# Patient Record
Sex: FEMALE | Race: OTHER | HISPANIC OR LATINO
[De-identification: names, ages, dates, MRNs, and addresses within clinical notes are randomized per-mention and may not be internally consistent; named-entity substitution may affect disease eponyms.]

---

## 2023-06-16 PROBLEM — Z00.00 ENCOUNTER FOR PREVENTIVE HEALTH EXAMINATION: Status: ACTIVE | Noted: 2023-06-16

## 2023-06-19 ENCOUNTER — LABORATORY RESULT (OUTPATIENT)
Age: 23
End: 2023-06-19

## 2023-06-19 ENCOUNTER — ASOB RESULT (OUTPATIENT)
Age: 23
End: 2023-06-19

## 2023-06-19 ENCOUNTER — APPOINTMENT (OUTPATIENT)
Dept: OBGYN | Facility: CLINIC | Age: 23
End: 2023-06-19
Payer: MEDICAID

## 2023-06-19 VITALS — WEIGHT: 173 LBS | SYSTOLIC BLOOD PRESSURE: 111 MMHG | DIASTOLIC BLOOD PRESSURE: 69 MMHG

## 2023-06-19 DIAGNOSIS — Z34.90 ENCOUNTER FOR SUPERVISION OF NORMAL PREGNANCY, UNSPECIFIED, UNSPECIFIED TRIMESTER: ICD-10-CM

## 2023-06-19 DIAGNOSIS — B36.9 SUPERFICIAL MYCOSIS, UNSPECIFIED: ICD-10-CM

## 2023-06-19 PROCEDURE — 76815 OB US LIMITED FETUS(S): CPT

## 2023-06-19 PROCEDURE — 76801 OB US < 14 WKS SINGLE FETUS: CPT

## 2023-06-19 PROCEDURE — XXXXX: CPT | Mod: 1L

## 2023-06-19 RX ORDER — PRENATAL VIT NO.130/IRON/FOLIC 27MG-0.8MG
28-0.8 TABLET ORAL
Qty: 30 | Refills: 7 | Status: ACTIVE | COMMUNITY
Start: 2023-06-19 | End: 1900-01-01

## 2023-06-19 RX ORDER — NYSTATIN 100MM UNIT
POWDER (EA) MISCELLANEOUS
Qty: 1 | Refills: 6 | Status: ACTIVE | COMMUNITY
Start: 2023-06-19 | End: 1900-01-01

## 2023-06-29 ENCOUNTER — APPOINTMENT (OUTPATIENT)
Dept: ANTEPARTUM | Facility: CLINIC | Age: 23
End: 2023-06-29
Payer: MEDICAID

## 2023-06-29 ENCOUNTER — ASOB RESULT (OUTPATIENT)
Age: 23
End: 2023-06-29

## 2023-06-29 PROCEDURE — 76801 OB US < 14 WKS SINGLE FETUS: CPT

## 2023-06-29 PROCEDURE — 76813 OB US NUCHAL MEAS 1 GEST: CPT | Mod: 59

## 2023-07-11 ENCOUNTER — NON-APPOINTMENT (OUTPATIENT)
Age: 23
End: 2023-07-11

## 2023-07-13 ENCOUNTER — APPOINTMENT (OUTPATIENT)
Dept: OBGYN | Facility: CLINIC | Age: 23
End: 2023-07-13
Payer: MEDICAID

## 2023-07-13 VITALS — WEIGHT: 166 LBS | DIASTOLIC BLOOD PRESSURE: 65 MMHG | SYSTOLIC BLOOD PRESSURE: 92 MMHG

## 2023-07-13 DIAGNOSIS — B37.31 ACUTE CANDIDIASIS OF VULVA AND VAGINA: ICD-10-CM

## 2023-07-13 PROCEDURE — 99212 OFFICE O/P EST SF 10 MIN: CPT | Mod: 25

## 2023-07-13 PROCEDURE — 0502F SUBSEQUENT PRENATAL CARE: CPT

## 2023-07-13 RX ORDER — TERCONAZOLE 8 MG/G
0.8 CREAM VAGINAL
Qty: 1 | Refills: 0 | Status: ACTIVE | COMMUNITY
Start: 2023-07-13 | End: 1900-01-01

## 2023-07-13 RX ORDER — FLUCONAZOLE 150 MG/1
150 TABLET ORAL
Qty: 1 | Refills: 0 | Status: ACTIVE | COMMUNITY
Start: 2023-07-13 | End: 1900-01-01

## 2023-07-15 ENCOUNTER — LABORATORY RESULT (OUTPATIENT)
Age: 23
End: 2023-07-15

## 2023-07-18 RX ORDER — PYRIDOXINE HCL (VITAMIN B6) 25 MG
25 TABLET ORAL EVERY 8 HOURS
Qty: 45 | Refills: 0 | Status: ACTIVE | COMMUNITY
Start: 2023-06-30 | End: 1900-01-01

## 2023-08-03 ENCOUNTER — INPATIENT (INPATIENT)
Facility: HOSPITAL | Age: 23
LOS: 3 days | Discharge: HOME | DRG: 832 | End: 2023-08-07
Attending: OBSTETRICS & GYNECOLOGY | Admitting: OBSTETRICS & GYNECOLOGY
Payer: MEDICAID

## 2023-08-03 ENCOUNTER — NON-APPOINTMENT (OUTPATIENT)
Age: 23
End: 2023-08-03

## 2023-08-03 VITALS
DIASTOLIC BLOOD PRESSURE: 56 MMHG | HEIGHT: 60.63 IN | SYSTOLIC BLOOD PRESSURE: 94 MMHG | TEMPERATURE: 99 F | RESPIRATION RATE: 17 BRPM | OXYGEN SATURATION: 98 % | WEIGHT: 165.35 LBS | HEART RATE: 88 BPM

## 2023-08-03 DIAGNOSIS — N12 TUBULO-INTERSTITIAL NEPHRITIS, NOT SPECIFIED AS ACUTE OR CHRONIC: ICD-10-CM

## 2023-08-03 LAB
ALBUMIN SERPL ELPH-MCNC: 3.1 G/DL — LOW (ref 3.5–5)
ALP SERPL-CCNC: 63 U/L — SIGNIFICANT CHANGE UP (ref 40–120)
ALT FLD-CCNC: 39 U/L DA — SIGNIFICANT CHANGE UP (ref 10–60)
ANION GAP SERPL CALC-SCNC: 11 MMOL/L — SIGNIFICANT CHANGE UP (ref 5–17)
APPEARANCE UR: CLEAR — SIGNIFICANT CHANGE UP
APTT BLD: 27.2 SEC — SIGNIFICANT CHANGE UP (ref 24.5–35.6)
AST SERPL-CCNC: 15 U/L — SIGNIFICANT CHANGE UP (ref 10–40)
BASOPHILS # BLD AUTO: 0.02 K/UL — SIGNIFICANT CHANGE UP (ref 0–0.2)
BASOPHILS NFR BLD AUTO: 0.2 % — SIGNIFICANT CHANGE UP (ref 0–2)
BILIRUB SERPL-MCNC: 0.4 MG/DL — SIGNIFICANT CHANGE UP (ref 0.2–1.2)
BILIRUB UR-MCNC: NEGATIVE — SIGNIFICANT CHANGE UP
BUN SERPL-MCNC: 5 MG/DL — LOW (ref 7–18)
CALCIUM SERPL-MCNC: 8.9 MG/DL — SIGNIFICANT CHANGE UP (ref 8.4–10.5)
CHLORIDE SERPL-SCNC: 106 MMOL/L — SIGNIFICANT CHANGE UP (ref 96–108)
CO2 SERPL-SCNC: 21 MMOL/L — LOW (ref 22–31)
COLOR SPEC: YELLOW — SIGNIFICANT CHANGE UP
CREAT SERPL-MCNC: 0.42 MG/DL — LOW (ref 0.5–1.3)
DIFF PNL FLD: ABNORMAL
EGFR: 143 ML/MIN/1.73M2 — SIGNIFICANT CHANGE UP
EOSINOPHIL # BLD AUTO: 0.04 K/UL — SIGNIFICANT CHANGE UP (ref 0–0.5)
EOSINOPHIL NFR BLD AUTO: 0.4 % — SIGNIFICANT CHANGE UP (ref 0–6)
GLUCOSE SERPL-MCNC: 84 MG/DL — SIGNIFICANT CHANGE UP (ref 70–99)
GLUCOSE UR QL: NEGATIVE — SIGNIFICANT CHANGE UP
HCG SERPL-ACNC: HIGH MIU/ML
HCT VFR BLD CALC: 31.9 % — LOW (ref 34.5–45)
HGB BLD-MCNC: 10.5 G/DL — LOW (ref 11.5–15.5)
IMM GRANULOCYTES NFR BLD AUTO: 0.4 % — SIGNIFICANT CHANGE UP (ref 0–0.9)
INR BLD: 1.04 RATIO — SIGNIFICANT CHANGE UP (ref 0.85–1.18)
KETONES UR-MCNC: ABNORMAL
LACTATE SERPL-SCNC: 0.6 MMOL/L — LOW (ref 0.7–2)
LEUKOCYTE ESTERASE UR-ACNC: ABNORMAL
LIDOCAIN IGE QN: 66 U/L — LOW (ref 73–393)
LYMPHOCYTES # BLD AUTO: 1.42 K/UL — SIGNIFICANT CHANGE UP (ref 1–3.3)
LYMPHOCYTES # BLD AUTO: 13.6 % — SIGNIFICANT CHANGE UP (ref 13–44)
MCHC RBC-ENTMCNC: 28.4 PG — SIGNIFICANT CHANGE UP (ref 27–34)
MCHC RBC-ENTMCNC: 32.9 GM/DL — SIGNIFICANT CHANGE UP (ref 32–36)
MCV RBC AUTO: 86.2 FL — SIGNIFICANT CHANGE UP (ref 80–100)
MONOCYTES # BLD AUTO: 0.87 K/UL — SIGNIFICANT CHANGE UP (ref 0–0.9)
MONOCYTES NFR BLD AUTO: 8.3 % — SIGNIFICANT CHANGE UP (ref 2–14)
NEUTROPHILS # BLD AUTO: 8.07 K/UL — HIGH (ref 1.8–7.4)
NEUTROPHILS NFR BLD AUTO: 77.1 % — HIGH (ref 43–77)
NITRITE UR-MCNC: POSITIVE
NRBC # BLD: 0 /100 WBCS — SIGNIFICANT CHANGE UP (ref 0–0)
NT-PROBNP SERPL-SCNC: 61 PG/ML — SIGNIFICANT CHANGE UP (ref 0–125)
PH UR: 7 — SIGNIFICANT CHANGE UP (ref 5–8)
PLATELET # BLD AUTO: 204 K/UL — SIGNIFICANT CHANGE UP (ref 150–400)
POTASSIUM SERPL-MCNC: 3.5 MMOL/L — SIGNIFICANT CHANGE UP (ref 3.5–5.3)
POTASSIUM SERPL-SCNC: 3.5 MMOL/L — SIGNIFICANT CHANGE UP (ref 3.5–5.3)
PROT SERPL-MCNC: 6.7 G/DL — SIGNIFICANT CHANGE UP (ref 6–8.3)
PROT UR-MCNC: 30 MG/DL
PROTHROM AB SERPL-ACNC: 11.8 SEC — SIGNIFICANT CHANGE UP (ref 9.5–13)
RAPID RVP RESULT: SIGNIFICANT CHANGE UP
RBC # BLD: 3.7 M/UL — LOW (ref 3.8–5.2)
RBC # FLD: 13.9 % — SIGNIFICANT CHANGE UP (ref 10.3–14.5)
SARS-COV-2 RNA SPEC QL NAA+PROBE: SIGNIFICANT CHANGE UP
SODIUM SERPL-SCNC: 138 MMOL/L — SIGNIFICANT CHANGE UP (ref 135–145)
SP GR SPEC: 1.01 — SIGNIFICANT CHANGE UP (ref 1.01–1.02)
TROPONIN I, HIGH SENSITIVITY RESULT: <3 NG/L — SIGNIFICANT CHANGE UP
UROBILINOGEN FLD QL: 1 MG/DL
WBC # BLD: 10.46 K/UL — SIGNIFICANT CHANGE UP (ref 3.8–10.5)
WBC # FLD AUTO: 10.46 K/UL — SIGNIFICANT CHANGE UP (ref 3.8–10.5)

## 2023-08-03 PROCEDURE — 76700 US EXAM ABDOM COMPLETE: CPT | Mod: 26

## 2023-08-03 PROCEDURE — 76805 OB US >/= 14 WKS SNGL FETUS: CPT | Mod: 26

## 2023-08-03 PROCEDURE — 99285 EMERGENCY DEPT VISIT HI MDM: CPT

## 2023-08-03 PROCEDURE — 71046 X-RAY EXAM CHEST 2 VIEWS: CPT | Mod: 26

## 2023-08-03 PROCEDURE — 93975 VASCULAR STUDY: CPT | Mod: 26

## 2023-08-03 PROCEDURE — 76817 TRANSVAGINAL US OBSTETRIC: CPT | Mod: 26

## 2023-08-03 PROCEDURE — 93010 ELECTROCARDIOGRAM REPORT: CPT

## 2023-08-03 RX ORDER — CEFAZOLIN SODIUM 1 G
1000 VIAL (EA) INJECTION ONCE
Refills: 0 | Status: COMPLETED | OUTPATIENT
Start: 2023-08-03 | End: 2023-08-03

## 2023-08-03 RX ORDER — CEFAZOLIN SODIUM 1 G
VIAL (EA) INJECTION
Refills: 0 | Status: DISCONTINUED | OUTPATIENT
Start: 2023-08-03 | End: 2023-08-07

## 2023-08-03 RX ORDER — METOCLOPRAMIDE HCL 10 MG
10 TABLET ORAL ONCE
Refills: 0 | Status: COMPLETED | OUTPATIENT
Start: 2023-08-03 | End: 2023-08-03

## 2023-08-03 RX ORDER — ACETAMINOPHEN 500 MG
650 TABLET ORAL EVERY 6 HOURS
Refills: 0 | Status: DISCONTINUED | OUTPATIENT
Start: 2023-08-03 | End: 2023-08-04

## 2023-08-03 RX ORDER — ONDANSETRON 8 MG/1
4 TABLET, FILM COATED ORAL EVERY 6 HOURS
Refills: 0 | Status: DISCONTINUED | OUTPATIENT
Start: 2023-08-03 | End: 2023-08-07

## 2023-08-03 RX ORDER — CEFAZOLIN SODIUM 1 G
VIAL (EA) INJECTION
Refills: 0 | Status: DISCONTINUED | OUTPATIENT
Start: 2023-08-03 | End: 2023-08-03

## 2023-08-03 RX ORDER — SODIUM CHLORIDE 9 MG/ML
1000 INJECTION, SOLUTION INTRAVENOUS
Refills: 0 | Status: DISCONTINUED | OUTPATIENT
Start: 2023-08-03 | End: 2023-08-04

## 2023-08-03 RX ORDER — CEFAZOLIN SODIUM 1 G
1000 VIAL (EA) INJECTION EVERY 8 HOURS
Refills: 0 | Status: DISCONTINUED | OUTPATIENT
Start: 2023-08-03 | End: 2023-08-07

## 2023-08-03 RX ORDER — SODIUM CHLORIDE 9 MG/ML
1000 INJECTION INTRAMUSCULAR; INTRAVENOUS; SUBCUTANEOUS ONCE
Refills: 0 | Status: COMPLETED | OUTPATIENT
Start: 2023-08-03 | End: 2023-08-03

## 2023-08-03 RX ORDER — CEFAZOLIN SODIUM 1 G
2000 VIAL (EA) INJECTION EVERY 8 HOURS
Refills: 0 | Status: DISCONTINUED | OUTPATIENT
Start: 2023-08-03 | End: 2023-08-03

## 2023-08-03 RX ORDER — CEFAZOLIN SODIUM 1 G
2000 VIAL (EA) INJECTION ONCE
Refills: 0 | Status: DISCONTINUED | OUTPATIENT
Start: 2023-08-03 | End: 2023-08-03

## 2023-08-03 RX ORDER — ACETAMINOPHEN 500 MG
650 TABLET ORAL ONCE
Refills: 0 | Status: COMPLETED | OUTPATIENT
Start: 2023-08-03 | End: 2023-08-03

## 2023-08-03 RX ADMIN — Medication 100 MILLIGRAM(S): at 21:57

## 2023-08-03 RX ADMIN — Medication 100 MILLIGRAM(S): at 14:05

## 2023-08-03 RX ADMIN — SODIUM CHLORIDE 75 MILLILITER(S): 9 INJECTION, SOLUTION INTRAVENOUS at 14:06

## 2023-08-03 RX ADMIN — Medication 650 MILLIGRAM(S): at 11:53

## 2023-08-03 RX ADMIN — Medication 650 MILLIGRAM(S): at 22:30

## 2023-08-03 RX ADMIN — Medication 104 MILLIGRAM(S): at 11:23

## 2023-08-03 RX ADMIN — Medication 650 MILLIGRAM(S): at 21:58

## 2023-08-03 RX ADMIN — SODIUM CHLORIDE 1000 MILLILITER(S): 9 INJECTION INTRAMUSCULAR; INTRAVENOUS; SUBCUTANEOUS at 11:22

## 2023-08-03 RX ADMIN — Medication 650 MILLIGRAM(S): at 11:23

## 2023-08-03 RX ADMIN — Medication 1 TABLET(S): at 14:05

## 2023-08-03 RX ADMIN — SODIUM CHLORIDE 75 MILLILITER(S): 9 INJECTION, SOLUTION INTRAVENOUS at 21:57

## 2023-08-03 NOTE — H&P ADULT - PROBLEM SELECTOR PLAN 1
Ancef 1g q 8 hours  Fetal heart checks daily  Increase oral hydration  Tylenol PRN    Dr Guerra aware

## 2023-08-03 NOTE — ED PROVIDER NOTE - PHYSICAL EXAMINATION
Afebrile, hemodynamically stable, saturating well on room air  NAD, well appearing, sitting comfortably in bed, no WOB/tachypnea, speaking full sentences  Head NCAT  EOMI grossly, anicteric  MMM  No JVD  RRR, nml S1/S2, no m/r/g  Lungs CTAB, no w/r/r  Abd soft, reports mild right lower quadrant TTP, ND, nml BS, no rebound or guarding, +CVAT  AAO, CN's 3-12 grossly intact, motor 5/5 in all extremities, full bilateral straight leg raise, normal independent gait  BERMUDEZ spontaneously, no leg cyanosis or edema  Skin warm, well perfused, no rashes or hives

## 2023-08-03 NOTE — H&P ADULT - HISTORY OF PRESENT ILLNESS
Patient is a 23 year old  at 17w4d who presents to ER with complaint of subjective fever, and right sided pain since yesterday.  States that the pain radiates from her right lower ribs to her right lower quadrant.  States that she didnt take any tylenol for fever or pain.  Denies vaginal bleeding, leakage of fluid, decreased fetal movement, abdominal pain, or any other concerns.    PNC with MYRIAM Guerra  PMHx: Denies  Sxhx: Denies  Meds: PNV  Allergies: NKDA   OBHx: Primgravida  GynHx: Normal menses, one partner, no stds, no cysts, no fibroids  SocialHx: Neg x3    Physical Exam:  Gen: A&O x3, NAD  Chest: CTABL  Cardiac: S1+S2+  RRR  Abdomen: Soft, nontender, gravid uterus, no suprapubic tenderness  + right CVAT

## 2023-08-03 NOTE — ED PROVIDER NOTE - CLINICAL SUMMARY MEDICAL DECISION MAKING FREE TEXT BOX
Abdomen nonperitoneal and multiple other symptoms and character low suspicion for appendicitis to warrant MRI imaging. Character and appearance low suspicion for ectopic pregnancy and ovarian torsion, and ultrasound __________. Character low suspicion for PID. Abdomen nonperitoneal and multiple other symptoms and character low suspicion for appendicitis to warrant MRI imaging. Character and appearance low suspicion for ectopic pregnancy and ovarian torsion, and ultrasound negative. Character low suspicion for PID. Character and CVAT c/w acute pyelonephritis, and pt with significantly positive UA. No significant hematuria and character lower suspicion and appearance very low suspicion for kidney stone, and OB/GYN assuming care at this time and do not want MRI to r/o definitively. Patient very well appearing, hemodynamically stable. Given IV abx. Admitted to OB/GYN for further monitoring, w/u, and care. Abdomen nonperitoneal and multiple other symptoms and character low suspicion for appendicitis to warrant MRI imaging. Character and appearance low suspicion for ectopic pregnancy and ovarian torsion, and ultrasound negative. Character low suspicion for PID. Character and CVAT c/w acute pyelonephritis, and pt with significantly positive UA. No significant hematuria and character lower suspicion and appearance very low suspicion for kidney stone, and OB/GYN assuming care at this time and do not want MRI to r/o definitively. Patient very well appearing, hemodynamically stable. Given IV abx. Admitted to OB/GYN for further monitoring, w/u, and care.  Regarding lower rib pain, had extensive shared decision making with pt and mother via  as well as via tech at Xray regarding CXR, and consented to CXR. Character low suspicion for PE and no tachycardia, hypoxia, or e/o DVT, and constellation of symptoms and another very apparent etiology make PE low suspicion at this time to warrant further w/u for this. No e/o PNA, PTX, or fluid overload on exam or CXR.

## 2023-08-03 NOTE — ED ADULT NURSE NOTE - ED STAT RN HANDOFF DETAILS 2
Patient A&Ox4 Pal[place] speaking admitted to OBGYN Vital signs stable as documented, IV intact, no redness or swelling noted. Endorsed to Naina VILLAREAL. Pending transfer to .

## 2023-08-03 NOTE — ED ADULT NURSE NOTE - OBJECTIVE STATEMENT
Pt c/o subjective fevers, right sided abdominal pain x yesterday. Pt denies diarrhea. Pt is 18 weeks pregnant. No acute distress noted, denies chest pain, no SOB noted.

## 2023-08-03 NOTE — ED PROVIDER NOTE - OBJECTIVE STATEMENT
[id number].  23-year-old female G1, P0 at 18 weeks and 5 days, followed by OB/GN Dr. Guerra, no other past medical history, on no medications, presents with mother with multiple complaints. She reports subjective fever since yesterday, has not attempted antipyresis. Also reporting right-sided lower rib pain, constant since yesterday, worsened with movement, cough, and deep breath. Associated right lower quadrant abdominal pain since yesterday, intermittent, worse with cough or deep breath or movement. On review of systems reports mild cough, dysuria, decreased urine output, and mild shortness of breath. Has had vomiting throughout pregnancy with last emesis this morning, though unchanged from her usual emesis. Denies diarrhea, constipation, abnormal vaginal discharge, and all other symptoms. Denies recent long distance travel.

## 2023-08-03 NOTE — PATIENT PROFILE ADULT - FALL HARM RISK - UNIVERSAL INTERVENTIONS
Bed in lowest position, wheels locked, appropriate side rails in place/Call bell, personal items and telephone in reach/Instruct patient to call for assistance before getting out of bed or chair/Non-slip footwear when patient is out of bed/[place] to call system/Physically safe environment - no spills, clutter or unnecessary equipment/Purposeful Proactive Rounding/Room/bathroom lighting operational, light cord in reach

## 2023-08-03 NOTE — PATIENT PROFILE ADULT - FUNCTIONAL ASSESSMENT - DAILY ACTIVITY 2.
Received request via: Pharmacy    Was the patient seen in the last year in this department? Yes    Does the patient have an active prescription (recently filled or refills available) for medication(s) requested? No     4 = No assist / stand by assistance

## 2023-08-03 NOTE — H&P ADULT - NSHPLABSRESULTS_GEN_ALL_CORE
10.5   10.46 )-----------( 204      ( 03 Aug 2023 10:56 )             31.9       < from: US Transvaginal, OB (08.03.23 @ 10:28) >      INTERPRETATION:  Transabdominal and transvaginal obstetrical ultrasounds    Indication: Pregnant woman with right lower quadrant abdominal pain.    Transabdominal and transvaginal obstetrical ultrasounds are performed.  A   single intrauterine gestation is seen. The fetal presentation is   cephalic. The cervix is closed, measuring 4.9 cm in length which is   within normal limits.. The placenta is anterior in location and it   appears grossly intact. There is positive fetal cardiac activity at 142   BPM. The ALDO measures 12.2 cm which is within normal limits.    Fetal measurements for gestational age:  Biparietal diameter 3.83 cm, 17 weeks and 5days  Head circumference 14.85 cm, 18 weeks and 0 days  Abdominal circumference 12.50 cm, 18 weeks and 1 days  Femoral length 2.81 cm, 18 weeks and 4 days    The average gestational age by ultrasound is 18 weeks and 1 days.   Expected date of delivery by ultrasound is 1/3/2024.    Estimated fetal weight is 233 g    The fetal diaphragm, stomach, kidneys and urinary bladder appear grossly   unremarkable.    The right ovary measures 3.9 x 4.3 x 1.9 cm and appears unremarkable. The   left ovary measures 5.3 x 4.9 x 1.7 cm and contains a 1.7 x 1.6 x 1.7 cm   follicular cyst. Duplex Doppler flow is demonstrated for both ovaries.    Impression: Single live intrauterine gestation.      < from: US Abdomen Complete (US Abdomen Complete .) (08.03.23 @ 10:28) >    IMPRESSION:  Mild right hydronephrosis.    < end of copied text >

## 2023-08-03 NOTE — ED ADULT NURSE NOTE - ED STAT RN HANDOFF DETAILS
Report given to MONO GIRALDO. Pt resting in bed, no acute distress noted, denies chest pain, no SOB noted.

## 2023-08-03 NOTE — ED ADULT NURSE NOTE - NSFALLUNIVINTERV_ED_ALL_ED
Bed/Stretcher in lowest position, wheels locked, appropriate side rails in place/Call bell, personal items and telephone in reach/Instruct patient to call for assistance before getting out of bed/chair/stretcher/Non-slip footwear applied when patient is off stretcher/[place] to call system/Physically safe environment - no spills, clutter or unnecessary equipment/Purposeful proactive rounding/Room/bathroom lighting operational, light cord in reach

## 2023-08-04 LAB
BASOPHILS # BLD AUTO: 0.02 K/UL — SIGNIFICANT CHANGE UP (ref 0–0.2)
BASOPHILS NFR BLD AUTO: 0.2 % — SIGNIFICANT CHANGE UP (ref 0–2)
EOSINOPHIL # BLD AUTO: 0.02 K/UL — SIGNIFICANT CHANGE UP (ref 0–0.5)
EOSINOPHIL NFR BLD AUTO: 0.2 % — SIGNIFICANT CHANGE UP (ref 0–6)
HCT VFR BLD CALC: 31.2 % — LOW (ref 34.5–45)
HGB BLD-MCNC: 10.6 G/DL — LOW (ref 11.5–15.5)
IMM GRANULOCYTES NFR BLD AUTO: 0.4 % — SIGNIFICANT CHANGE UP (ref 0–0.9)
LYMPHOCYTES # BLD AUTO: 1.23 K/UL — SIGNIFICANT CHANGE UP (ref 1–3.3)
LYMPHOCYTES # BLD AUTO: 10.7 % — LOW (ref 13–44)
MCHC RBC-ENTMCNC: 28.7 PG — SIGNIFICANT CHANGE UP (ref 27–34)
MCHC RBC-ENTMCNC: 34 GM/DL — SIGNIFICANT CHANGE UP (ref 32–36)
MCV RBC AUTO: 84.6 FL — SIGNIFICANT CHANGE UP (ref 80–100)
MONOCYTES # BLD AUTO: 0.92 K/UL — HIGH (ref 0–0.9)
MONOCYTES NFR BLD AUTO: 8 % — SIGNIFICANT CHANGE UP (ref 2–14)
NEUTROPHILS # BLD AUTO: 9.21 K/UL — HIGH (ref 1.8–7.4)
NEUTROPHILS NFR BLD AUTO: 80.5 % — HIGH (ref 43–77)
NRBC # BLD: 0 /100 WBCS — SIGNIFICANT CHANGE UP (ref 0–0)
PLATELET # BLD AUTO: 201 K/UL — SIGNIFICANT CHANGE UP (ref 150–400)
RBC # BLD: 3.69 M/UL — LOW (ref 3.8–5.2)
RBC # FLD: 13.9 % — SIGNIFICANT CHANGE UP (ref 10.3–14.5)
WBC # BLD: 11.45 K/UL — HIGH (ref 3.8–10.5)
WBC # FLD AUTO: 11.45 K/UL — HIGH (ref 3.8–10.5)

## 2023-08-04 RX ORDER — MORPHINE SULFATE 50 MG/1
4 CAPSULE, EXTENDED RELEASE ORAL EVERY 4 HOURS
Refills: 0 | Status: DISCONTINUED | OUTPATIENT
Start: 2023-08-04 | End: 2023-08-07

## 2023-08-04 RX ORDER — FAMOTIDINE 10 MG/ML
20 INJECTION INTRAVENOUS DAILY
Refills: 0 | Status: DISCONTINUED | OUTPATIENT
Start: 2023-08-04 | End: 2023-08-07

## 2023-08-04 RX ORDER — FERROUS SULFATE 325(65) MG
325 TABLET ORAL DAILY
Refills: 0 | Status: DISCONTINUED | OUTPATIENT
Start: 2023-08-04 | End: 2023-08-07

## 2023-08-04 RX ORDER — SODIUM CHLORIDE 9 MG/ML
1000 INJECTION, SOLUTION INTRAVENOUS
Refills: 0 | Status: DISCONTINUED | OUTPATIENT
Start: 2023-08-04 | End: 2023-08-05

## 2023-08-04 RX ORDER — ACETAMINOPHEN 500 MG
975 TABLET ORAL EVERY 6 HOURS
Refills: 0 | Status: DISCONTINUED | OUTPATIENT
Start: 2023-08-04 | End: 2023-08-07

## 2023-08-04 RX ADMIN — Medication 100 MILLIGRAM(S): at 13:52

## 2023-08-04 RX ADMIN — SODIUM CHLORIDE 75 MILLILITER(S): 9 INJECTION, SOLUTION INTRAVENOUS at 12:17

## 2023-08-04 RX ADMIN — Medication 100 MILLIGRAM(S): at 22:03

## 2023-08-04 RX ADMIN — Medication 975 MILLIGRAM(S): at 21:00

## 2023-08-04 RX ADMIN — Medication 1 TABLET(S): at 12:05

## 2023-08-04 RX ADMIN — SODIUM CHLORIDE 250 MILLILITER(S): 9 INJECTION, SOLUTION INTRAVENOUS at 20:16

## 2023-08-04 RX ADMIN — Medication 100 MILLIGRAM(S): at 05:16

## 2023-08-04 RX ADMIN — Medication 975 MILLIGRAM(S): at 20:15

## 2023-08-04 NOTE — CHART NOTE - NSCHARTNOTEFT_GEN_A_CORE
Patient seen at bedside  offers no complaints  denies abd pain, vaginal bleeding, leakage of fluids or contraction pain    Vital Signs Last 24 Hrs  T(C): 36.3 (04 Aug 2023 12:41), Max: 38.7 (04 Aug 2023 05:55)  T(F): 97.4 (04 Aug 2023 12:41), Max: 101.6 (04 Aug 2023 05:55)  HR: 84 (04 Aug 2023 12:41) (84 - 115)  BP: 99/62 (04 Aug 2023 12:41) (99/62 - 116/67)  BP(mean): --  RR: 18 (04 Aug 2023 12:41) (18 - 18)  SpO2: 97% (04 Aug 2023 12:41) (96% - 97%)    Parameters below as of 04 Aug 2023 12:41  Patient On (Oxygen Delivery Method): room air    + FH via bedside sono    a/p siup @  17w5d HD#2, susptected pylenephritits, currently stable  - continue ancef q 8hrs  - tylenol prn fever  - regular diet  - oob, ambulation,   - continue current care

## 2023-08-05 LAB
ANION GAP SERPL CALC-SCNC: 8 MMOL/L — SIGNIFICANT CHANGE UP (ref 5–17)
BASOPHILS # BLD AUTO: 0.03 K/UL — SIGNIFICANT CHANGE UP (ref 0–0.2)
BASOPHILS NFR BLD AUTO: 0.3 % — SIGNIFICANT CHANGE UP (ref 0–2)
BUN SERPL-MCNC: 3 MG/DL — LOW (ref 7–18)
CALCIUM SERPL-MCNC: 8.5 MG/DL — SIGNIFICANT CHANGE UP (ref 8.4–10.5)
CHLORIDE SERPL-SCNC: 109 MMOL/L — HIGH (ref 96–108)
CO2 SERPL-SCNC: 22 MMOL/L — SIGNIFICANT CHANGE UP (ref 22–31)
CREAT SERPL-MCNC: 0.38 MG/DL — LOW (ref 0.5–1.3)
EGFR: 146 ML/MIN/1.73M2 — SIGNIFICANT CHANGE UP
EOSINOPHIL # BLD AUTO: 0.07 K/UL — SIGNIFICANT CHANGE UP (ref 0–0.5)
EOSINOPHIL NFR BLD AUTO: 0.7 % — SIGNIFICANT CHANGE UP (ref 0–6)
GLUCOSE SERPL-MCNC: 99 MG/DL — SIGNIFICANT CHANGE UP (ref 70–99)
HCT VFR BLD CALC: 29.2 % — LOW (ref 34.5–45)
HGB BLD-MCNC: 9.7 G/DL — LOW (ref 11.5–15.5)
IMM GRANULOCYTES NFR BLD AUTO: 0.4 % — SIGNIFICANT CHANGE UP (ref 0–0.9)
LYMPHOCYTES # BLD AUTO: 1.54 K/UL — SIGNIFICANT CHANGE UP (ref 1–3.3)
LYMPHOCYTES # BLD AUTO: 16.1 % — SIGNIFICANT CHANGE UP (ref 13–44)
MCHC RBC-ENTMCNC: 28.8 PG — SIGNIFICANT CHANGE UP (ref 27–34)
MCHC RBC-ENTMCNC: 33.2 GM/DL — SIGNIFICANT CHANGE UP (ref 32–36)
MCV RBC AUTO: 86.6 FL — SIGNIFICANT CHANGE UP (ref 80–100)
MONOCYTES # BLD AUTO: 0.74 K/UL — SIGNIFICANT CHANGE UP (ref 0–0.9)
MONOCYTES NFR BLD AUTO: 7.7 % — SIGNIFICANT CHANGE UP (ref 2–14)
NEUTROPHILS # BLD AUTO: 7.16 K/UL — SIGNIFICANT CHANGE UP (ref 1.8–7.4)
NEUTROPHILS NFR BLD AUTO: 74.8 % — SIGNIFICANT CHANGE UP (ref 43–77)
NRBC # BLD: 0 /100 WBCS — SIGNIFICANT CHANGE UP (ref 0–0)
PLATELET # BLD AUTO: 189 K/UL — SIGNIFICANT CHANGE UP (ref 150–400)
POTASSIUM SERPL-MCNC: 3.4 MMOL/L — LOW (ref 3.5–5.3)
POTASSIUM SERPL-SCNC: 3.4 MMOL/L — LOW (ref 3.5–5.3)
RBC # BLD: 3.37 M/UL — LOW (ref 3.8–5.2)
RBC # FLD: 14.1 % — SIGNIFICANT CHANGE UP (ref 10.3–14.5)
SARS-COV-2 RNA SPEC QL NAA+PROBE: SIGNIFICANT CHANGE UP
SODIUM SERPL-SCNC: 139 MMOL/L — SIGNIFICANT CHANGE UP (ref 135–145)
WBC # BLD: 9.58 K/UL — SIGNIFICANT CHANGE UP (ref 3.8–10.5)
WBC # FLD AUTO: 9.58 K/UL — SIGNIFICANT CHANGE UP (ref 3.8–10.5)

## 2023-08-05 PROCEDURE — 99231 SBSQ HOSP IP/OBS SF/LOW 25: CPT

## 2023-08-05 RX ORDER — SODIUM CHLORIDE 9 MG/ML
3 INJECTION INTRAMUSCULAR; INTRAVENOUS; SUBCUTANEOUS EVERY 8 HOURS
Refills: 0 | Status: DISCONTINUED | OUTPATIENT
Start: 2023-08-05 | End: 2023-08-07

## 2023-08-05 RX ORDER — POTASSIUM CHLORIDE 20 MEQ
40 PACKET (EA) ORAL ONCE
Refills: 0 | Status: COMPLETED | OUTPATIENT
Start: 2023-08-05 | End: 2023-08-05

## 2023-08-05 RX ADMIN — SODIUM CHLORIDE 3 MILLILITER(S): 9 INJECTION INTRAMUSCULAR; INTRAVENOUS; SUBCUTANEOUS at 21:06

## 2023-08-05 RX ADMIN — FAMOTIDINE 20 MILLIGRAM(S): 10 INJECTION INTRAVENOUS at 11:03

## 2023-08-05 RX ADMIN — Medication 100 MILLIGRAM(S): at 05:13

## 2023-08-05 RX ADMIN — SODIUM CHLORIDE 3 MILLILITER(S): 9 INJECTION INTRAMUSCULAR; INTRAVENOUS; SUBCUTANEOUS at 13:05

## 2023-08-05 RX ADMIN — Medication 100 MILLIGRAM(S): at 13:05

## 2023-08-05 RX ADMIN — Medication 325 MILLIGRAM(S): at 11:03

## 2023-08-05 RX ADMIN — Medication 40 MILLIEQUIVALENT(S): at 06:56

## 2023-08-05 RX ADMIN — Medication 100 MILLIGRAM(S): at 21:07

## 2023-08-05 RX ADMIN — Medication 1 TABLET(S): at 11:03

## 2023-08-06 LAB
ANION GAP SERPL CALC-SCNC: 7 MMOL/L — SIGNIFICANT CHANGE UP (ref 5–17)
BASOPHILS # BLD AUTO: 0.04 K/UL — SIGNIFICANT CHANGE UP (ref 0–0.2)
BASOPHILS NFR BLD AUTO: 0.4 % — SIGNIFICANT CHANGE UP (ref 0–2)
BUN SERPL-MCNC: 3 MG/DL — LOW (ref 7–18)
CALCIUM SERPL-MCNC: 8.7 MG/DL — SIGNIFICANT CHANGE UP (ref 8.4–10.5)
CHLORIDE SERPL-SCNC: 109 MMOL/L — HIGH (ref 96–108)
CO2 SERPL-SCNC: 22 MMOL/L — SIGNIFICANT CHANGE UP (ref 22–31)
CREAT SERPL-MCNC: 0.37 MG/DL — LOW (ref 0.5–1.3)
EGFR: 147 ML/MIN/1.73M2 — SIGNIFICANT CHANGE UP
EOSINOPHIL # BLD AUTO: 0.2 K/UL — SIGNIFICANT CHANGE UP (ref 0–0.5)
EOSINOPHIL NFR BLD AUTO: 2.2 % — SIGNIFICANT CHANGE UP (ref 0–6)
GLUCOSE SERPL-MCNC: 83 MG/DL — SIGNIFICANT CHANGE UP (ref 70–99)
HCT VFR BLD CALC: 28.1 % — LOW (ref 34.5–45)
HGB BLD-MCNC: 9.4 G/DL — LOW (ref 11.5–15.5)
IMM GRANULOCYTES NFR BLD AUTO: 0.3 % — SIGNIFICANT CHANGE UP (ref 0–0.9)
LYMPHOCYTES # BLD AUTO: 2.13 K/UL — SIGNIFICANT CHANGE UP (ref 1–3.3)
LYMPHOCYTES # BLD AUTO: 23.4 % — SIGNIFICANT CHANGE UP (ref 13–44)
MCHC RBC-ENTMCNC: 28.8 PG — SIGNIFICANT CHANGE UP (ref 27–34)
MCHC RBC-ENTMCNC: 33.5 GM/DL — SIGNIFICANT CHANGE UP (ref 32–36)
MCV RBC AUTO: 86.2 FL — SIGNIFICANT CHANGE UP (ref 80–100)
MONOCYTES # BLD AUTO: 0.75 K/UL — SIGNIFICANT CHANGE UP (ref 0–0.9)
MONOCYTES NFR BLD AUTO: 8.2 % — SIGNIFICANT CHANGE UP (ref 2–14)
NEUTROPHILS # BLD AUTO: 5.95 K/UL — SIGNIFICANT CHANGE UP (ref 1.8–7.4)
NEUTROPHILS NFR BLD AUTO: 65.5 % — SIGNIFICANT CHANGE UP (ref 43–77)
NRBC # BLD: 0 /100 WBCS — SIGNIFICANT CHANGE UP (ref 0–0)
PLATELET # BLD AUTO: 205 K/UL — SIGNIFICANT CHANGE UP (ref 150–400)
POTASSIUM SERPL-MCNC: 3.4 MMOL/L — LOW (ref 3.5–5.3)
POTASSIUM SERPL-SCNC: 3.4 MMOL/L — LOW (ref 3.5–5.3)
RBC # BLD: 3.26 M/UL — LOW (ref 3.8–5.2)
RBC # FLD: 14 % — SIGNIFICANT CHANGE UP (ref 10.3–14.5)
SODIUM SERPL-SCNC: 138 MMOL/L — SIGNIFICANT CHANGE UP (ref 135–145)
WBC # BLD: 9.1 K/UL — SIGNIFICANT CHANGE UP (ref 3.8–10.5)
WBC # FLD AUTO: 9.1 K/UL — SIGNIFICANT CHANGE UP (ref 3.8–10.5)

## 2023-08-06 RX ADMIN — Medication 1 TABLET(S): at 11:09

## 2023-08-06 RX ADMIN — SODIUM CHLORIDE 3 MILLILITER(S): 9 INJECTION INTRAMUSCULAR; INTRAVENOUS; SUBCUTANEOUS at 21:19

## 2023-08-06 RX ADMIN — Medication 100 MILLIGRAM(S): at 13:19

## 2023-08-06 RX ADMIN — SODIUM CHLORIDE 3 MILLILITER(S): 9 INJECTION INTRAMUSCULAR; INTRAVENOUS; SUBCUTANEOUS at 05:01

## 2023-08-06 RX ADMIN — Medication 325 MILLIGRAM(S): at 11:08

## 2023-08-06 RX ADMIN — FAMOTIDINE 20 MILLIGRAM(S): 10 INJECTION INTRAVENOUS at 11:09

## 2023-08-06 RX ADMIN — SODIUM CHLORIDE 3 MILLILITER(S): 9 INJECTION INTRAMUSCULAR; INTRAVENOUS; SUBCUTANEOUS at 13:22

## 2023-08-06 RX ADMIN — Medication 100 MILLIGRAM(S): at 05:03

## 2023-08-06 RX ADMIN — Medication 100 MILLIGRAM(S): at 21:28

## 2023-08-07 ENCOUNTER — TRANSCRIPTION ENCOUNTER (OUTPATIENT)
Age: 23
End: 2023-08-07

## 2023-08-07 VITALS
HEART RATE: 67 BPM | DIASTOLIC BLOOD PRESSURE: 46 MMHG | TEMPERATURE: 98 F | RESPIRATION RATE: 18 BRPM | OXYGEN SATURATION: 99 % | SYSTOLIC BLOOD PRESSURE: 102 MMHG

## 2023-08-07 PROCEDURE — 87077 CULTURE AEROBIC IDENTIFY: CPT

## 2023-08-07 PROCEDURE — 96375 TX/PRO/DX INJ NEW DRUG ADDON: CPT

## 2023-08-07 PROCEDURE — 96374 THER/PROPH/DIAG INJ IV PUSH: CPT

## 2023-08-07 PROCEDURE — 76700 US EXAM ABDOM COMPLETE: CPT

## 2023-08-07 PROCEDURE — 85610 PROTHROMBIN TIME: CPT

## 2023-08-07 PROCEDURE — 80053 COMPREHEN METABOLIC PANEL: CPT

## 2023-08-07 PROCEDURE — 87186 SC STD MICRODIL/AGAR DIL: CPT

## 2023-08-07 PROCEDURE — 83605 ASSAY OF LACTIC ACID: CPT

## 2023-08-07 PROCEDURE — 83690 ASSAY OF LIPASE: CPT

## 2023-08-07 PROCEDURE — 85025 COMPLETE CBC W/AUTO DIFF WBC: CPT

## 2023-08-07 PROCEDURE — 87086 URINE CULTURE/COLONY COUNT: CPT

## 2023-08-07 PROCEDURE — 36415 COLL VENOUS BLD VENIPUNCTURE: CPT

## 2023-08-07 PROCEDURE — 0225U NFCT DS DNA&RNA 21 SARSCOV2: CPT

## 2023-08-07 PROCEDURE — 76805 OB US >/= 14 WKS SNGL FETUS: CPT

## 2023-08-07 PROCEDURE — 87635 SARS-COV-2 COVID-19 AMP PRB: CPT

## 2023-08-07 PROCEDURE — 93975 VASCULAR STUDY: CPT

## 2023-08-07 PROCEDURE — 99285 EMERGENCY DEPT VISIT HI MDM: CPT

## 2023-08-07 PROCEDURE — 81001 URINALYSIS AUTO W/SCOPE: CPT

## 2023-08-07 PROCEDURE — 80048 BASIC METABOLIC PNL TOTAL CA: CPT

## 2023-08-07 PROCEDURE — 93005 ELECTROCARDIOGRAM TRACING: CPT

## 2023-08-07 PROCEDURE — 99238 HOSP IP/OBS DSCHRG MGMT 30/<: CPT

## 2023-08-07 PROCEDURE — 84702 CHORIONIC GONADOTROPIN TEST: CPT

## 2023-08-07 PROCEDURE — 71046 X-RAY EXAM CHEST 2 VIEWS: CPT

## 2023-08-07 PROCEDURE — 76817 TRANSVAGINAL US OBSTETRIC: CPT

## 2023-08-07 PROCEDURE — 83880 ASSAY OF NATRIURETIC PEPTIDE: CPT

## 2023-08-07 PROCEDURE — 85730 THROMBOPLASTIN TIME PARTIAL: CPT

## 2023-08-07 PROCEDURE — 84484 ASSAY OF TROPONIN QUANT: CPT

## 2023-08-07 RX ORDER — CEPHALEXIN 500 MG
1 CAPSULE ORAL
Qty: 20 | Refills: 0
Start: 2023-08-07 | End: 2023-08-16

## 2023-08-07 RX ORDER — ACETAMINOPHEN 500 MG
2 TABLET ORAL
Qty: 80 | Refills: 0
Start: 2023-08-07 | End: 2023-08-16

## 2023-08-07 RX ADMIN — FAMOTIDINE 20 MILLIGRAM(S): 10 INJECTION INTRAVENOUS at 12:03

## 2023-08-07 RX ADMIN — Medication 100 MILLIGRAM(S): at 13:40

## 2023-08-07 RX ADMIN — SODIUM CHLORIDE 3 MILLILITER(S): 9 INJECTION INTRAMUSCULAR; INTRAVENOUS; SUBCUTANEOUS at 13:44

## 2023-08-07 RX ADMIN — Medication 1 TABLET(S): at 12:03

## 2023-08-07 RX ADMIN — Medication 100 MILLIGRAM(S): at 05:14

## 2023-08-07 RX ADMIN — SODIUM CHLORIDE 3 MILLILITER(S): 9 INJECTION INTRAMUSCULAR; INTRAVENOUS; SUBCUTANEOUS at 05:12

## 2023-08-07 RX ADMIN — Medication 325 MILLIGRAM(S): at 12:03

## 2023-08-07 NOTE — PROGRESS NOTE ADULT - SUBJECTIVE AND OBJECTIVE BOX
23y  [facility] comfortably offers complaints of headache, neck pain and sorethroat started since 8/2.  + Ambulation, voiding without difficulty, + flatus; tolerating regular diet. Denies HA, CP, SOB, N/V/D,  no bm; dizziness, palpitations, worsening abdominal pain, worsening vaginal bleeding, or any other concerns.     Vital Signs Last 24 Hrs  T(C): 37.2 (05 Aug 2023 06:20), Max: 38.1 (04 Aug 2023 20:10)  T(F): 99 (05 Aug 2023 06:20), Max: 100.5 (04 Aug 2023 20:10)  HR: 80 (05 Aug 2023 06:20) (71 - 84)  BP: 107/68 (05 Aug 2023 06:20) (92/58 - 107/68)  BP(mean): --  RR: 18 (05 Aug 2023 06:20) (18 - 18)  SpO2: 98% (05 Aug 2023 06:20) (97% - 99%)    Parameters below as of 05 Aug 2023 06:20  Patient On (Oxygen Delivery Method): room air        Gen: A&O x 3, NAD  Chest: CTA B/L  Cardiac: S1,S2  RRR  Abdomen: +BS; soft; Nontender, gravid abdomen fundus at 18cm,   Gyn: no vaginal bleeding   Back: pos CVA less than before.  Extremities: Nontender, no worsening edema                          9.7    9.58  )-----------( 189      ( 05 Aug 2023 05:58 )             29.2     08-05    139  |  109<H>  |  3<L>  ----------------------------<  99  3.4<L>   |  22  |  0.38<L>    Ca    8.5      05 Aug 2023 05:58    TPro  6.7  /  Alb  3.1<L>  /  TBili  0.4  /  DBili  x   /  AST  15  /  ALT  39  /  AlkPhos  63  08-03    Covid/Rsv neg on 8/3.   
Patient seen at bedside resting comfortably.  admits to fetal movement  denies leakage of fluids, vaginal bleeding, contraction pain.    + Ambulation, voiding without difficulty, + flatus; tolerating regular diet.   Denies HA, CP, SOB, N/V/D,  no bm; dizziness, palpitations, worsening abdominal pain, worsening vaginal bleeding, or any other concerns.     Vital Signs Last 24 Hrs  T(C): 36.7 (06 Aug 2023 05:10), Max: 37.2 (05 Aug 2023 13:06)  T(F): 98.1 (06 Aug 2023 05:10), Max: 98.9 (05 Aug 2023 13:06)  HR: 68 (06 Aug 2023 05:10) (67 - 75)  BP: 96/58 (06 Aug 2023 05:10) (96/58 - 113/54)  BP(mean): 75 (05 Aug 2023 20:35) (75 - 75)  RR: 16 (06 Aug 2023 05:10) (16 - 16)  SpO2: 96% (06 Aug 2023 05:10) (96% - 98%)    Parameters below as of 06 Aug 2023 05:10  Patient On (Oxygen Delivery Method): room air        Gen: A&O x 3, NAD  Abdomen: +BS; soft; Nontender, gravid abdomen fundus at 18cm,   Gyn: no vaginal bleeding .  + FH noted on bedside sono                                               9.4    9.10  )-----------( 205      ( 06 Aug 2023 06:25 )             28.1     Culture - Urine (08.03.23 @ 10:56)    Specimen Source: Clean Catch Clean Catch (Midstream)   Culture Results:   >100,000 CFU/ml Gram Negative Rods prelim    Covid/Rsv neg on 8/3. 
subjective:  no complaints.feels better    objective:    Vital Signs Last 24 Hrs  T(C): 36.7 (06 Aug 2023 13:03), Max: 36.8 (05 Aug 2023 20:35)  T(F): 98.1 (06 Aug 2023 13:03), Max: 98.3 (05 Aug 2023 20:35)  HR: 65 (06 Aug 2023 13:03) (65 - 68)  BP: 109/61 (06 Aug 2023 13:03) (96/58 - 109/61)  BP(mean): 75 (05 Aug 2023 20:35) (75 - 75)  RR: 18 (06 Aug 2023 13:03) (16 - 18)  SpO2: 98% (06 Aug 2023 13:03) (96% - 98%)    Parameters below as of 06 Aug 2023 13:03  Patient On (Oxygen Delivery Method): room air        REVIEW OF SYSTEMS:  ALL SYSTEMS WNL    Allergies    No Known Allergies    Intolerances        PHYSICAL EXAM:  Breasts:soft,no masses felt    Respiratory:wnl    Cardiovascular:s1&S2 HEARD,NO MURMURS    Gastrointestinal:BOWEL SOUNDS PRESENT,ABDOMEN SOFT    Genitourinary:UTERUS soft,21weeks,fetal parts felt,right renal angle tenderness present    Extremities:NO EDEMA PRESENT    MEDICATIONS  (STANDING):  ceFAZolin   IVPB      ceFAZolin   IVPB 1000 milliGRAM(s) IV Intermittent every 8 hours  famotidine    Tablet 20 milliGRAM(s) Oral daily  ferrous    sulfate 325 milliGRAM(s) Oral daily  prenatal multivitamin 1 Tablet(s) Oral daily  sodium chloride 0.9% lock flush 3 milliLiter(s) IV Push every 8 hours    MEDICATIONS  (PRN):  acetaminophen     Tablet .. 975 milliGRAM(s) Oral every 6 hours PRN Temp greater or equal to 38C (100.4F), Mild Pain (1 - 3)  morphine  - Injectable 4 milliGRAM(s) IV Push every 4 hours PRN Severe Pain (7 - 10)  ondansetron Injectable 4 milliGRAM(s) IV Push every 6 hours PRN Nausea and/or Vomiting      LABS:                        9.4    9.10  )-----------( 205      ( 06 Aug 2023 06:25 )             28.1     08-06    138  |  109<H>  |  3<L>  ----------------------------<  83  3.4<L>   |  22  |  0.37<L>    Ca    8.7      06 Aug 2023 06:25        Urinalysis Basic - ( 06 Aug 2023 06:25 )    Color: x / Appearance: x / SG: x / pH: x  Gluc: 83 mg/dL / Ketone: x  / Bili: x / Urobili: x   Blood: x / Protein: x / Nitrite: x   Leuk Esterase: x / RBC: x / WBC x   Sq Epi: x / Non Sq Epi: x / Bacteria: x        RADIOLOGY & ADDITIONAL TESTS:  assessement acute pylonephritis on iv antibiotics  urine cultures growing gram negative rods <100,000.waiting for sensitivity  plan for continue present management
8:25am  23[facility]  [facility] offers no increased flank pain. + Ambulation, voiding without difficulty, + flatus; tolerating regular diet. Denies HA, CP, SOB, N/V/D,  no bm; dizziness, palpitations, worsening abdominal pain, worsening vaginal bleeding, or any other concerns.     Vital Signs Last 24 Hrs  T(C): 38.7 (04 Aug 2023 05:55), Max: 38.7 (04 Aug 2023 05:55)  T(F): 101.6 (04 Aug 2023 05:55), Max: 101.6 (04 Aug 2023 05:55)  HR: 115 (04 Aug 2023 05:55) (69 - 115)  BP: 106/64 (04 Aug 2023 05:55) (95/52 - 116/67)  BP(mean): --  RR: 18 (04 Aug 2023 05:55) (18 - 18)  SpO2: 97% (04 Aug 2023 05:55) (96% - 98%)    Parameters below as of 04 Aug 2023 05:55  Patient On (Oxygen Delivery Method): room air        Gen: A&O x 3, NAD  Chest: CTA B/L  Cardiac: S1,S2  RRR  Abdomen: +BS; soft; Nontender, nondistended  back; right CVA tenderness  Gyn: no vaginal bleeding   Extremities: Nontender, no worsening edema                          10.6   11.45 )-----------( 201      ( 04 Aug 2023 07:16 )             31.2     08-03    138  |  106  |  5<L>  ----------------------------<  84  3.5   |  21<L>  |  0.42<L>    Ca    8.9      03 Aug 2023 10:56    TPro  6.7  /  Alb  3.1<L>  /  TBili  0.4  /  DBili  x   /  AST  15  /  ALT  39  /  AlkPhos  63  08-03         
GYN PA Progress Note HD#5    Patient seen and evaluated at bedside, reports feeling better denies fever, chills, abdominal pain, vaginal bleeding, LOF, vaginal discharge, worsening back pain, CP, SOB, leg pain.    Vital Signs Last 24 Hrs  T(C): 36.5 (07 Aug 2023 04:55), Max: 36.7 (06 Aug 2023 13:03)  T(F): 97.7 (07 Aug 2023 04:55), Max: 98.1 (06 Aug 2023 13:03)  HR: 69 (07 Aug 2023 04:55) (65 - 69)  BP: 98/59 (07 Aug 2023 04:55) (97/58 - 109/61)  BP(mean): --  RR: 18 (07 Aug 2023 04:55) (18 - 18)  SpO2: 97% (07 Aug 2023 04:55) (97% - 98%)    Parameters below as of 07 Aug 2023 04:55  Patient On (Oxygen Delivery Method): room air    gen aox3, not in acute distress, appears well  abd gravid, soft, nontender, no guarding, no rebound no cva tenderness b/l  gyn no VB  ext no calf tenderness b/l    Culture Results:   >100,000 CFU/ml Gram Negative Rods (08.03.23 @ 10:56)

## 2023-08-07 NOTE — DISCHARGE NOTE PROVIDER - HOSPITAL COURSE
18 weeks gestation with pyleonephritis  initially on admission patient had right flank pain and fever  improved

## 2023-08-07 NOTE — PROGRESS NOTE ADULT - ASSESSMENT
a/p HD#5 22y/o  18 weeks 1 days with pyelonephritis, improved  discharge planning for today  continue current care  FH check before discharge  yovany Guerra  
A/P: HD#2 at 17.5wks with pyelonephritis    continue IV ancef  - regular diet  -oob, encourage ambulation  awaiting urine culture.
A/P: HD # 3 with pyelonephritis improving  URI  -cont pain management  -advance diet to regular   -oob, encourage ambulation  -f/u urine culture   
a/p siup @18 weeks admitted for suspected pyelonephritis, currently stable  - continue antibiotic therapy  - awaiting final urine culture result  - discharge planing  d/w Dr Cotton, (covering for group)

## 2023-08-07 NOTE — CHART NOTE - NSCHARTNOTEFT_GEN_A_CORE
GYN PA Focused Note    Patient reevaluated with Dr. Martinez at bedside  offers no complaints at this time  limited bedside sono +FH  dc home with keflex 500mg q12hrx 10days  f/u in the office on Thursday as scheduled  patient verbalized understanding of discharge instructions

## 2023-08-07 NOTE — PROGRESS NOTE ADULT - PROBLEM SELECTOR PLAN 1
a/p HD#5 24y/o  18 weeks 1 days with pyelonephritis, improved  discharge planning for today  continue current care  FH check before discharge  yovany Guerra

## 2023-08-07 NOTE — DISCHARGE NOTE NURSING/CASE MANAGEMENT/SOCIAL WORK - NSDCPEFALRISK_GEN_ALL_CORE
For information on Fall & Injury Prevention, visit: [email or web address] OR  [email or web address] OR  https://www.cdc.gov/steadi/patient.html

## 2023-08-07 NOTE — DISCHARGE NOTE NURSING/CASE MANAGEMENT/SOCIAL WORK - PATIENT PORTAL LINK FT
You can access the FollowMyHealth Patient Portal offered by [facility] by registering at the following website: [email or web address]. By joining [facility]’s FollowMyHealth portal, you will also be able to view your health information using other applications (apps) compatible with our system.

## 2023-08-07 NOTE — DISCHARGE NOTE PROVIDER - NSDCFUSCHEDAPPT_GEN_ALL_CORE_FT
Kerwin Guerra  [facility] Physician [facility]  OBGYNGEN [address]  Scheduled Appointment: 08/10/2023    [facility] Physician [facility]  [address]  Scheduled Appointment: 08/21/2023

## 2023-08-07 NOTE — DISCHARGE NOTE PROVIDER - NSDCMRMEDTOKEN_GEN_ALL_CORE_FT
cephalexin 500 mg oral tablet: 1 tab(s) orally 2 times a day  Tylenol 8 Hour 650 mg oral tablet, extended release: 2 tab(s) orally 4 times a day

## 2023-08-07 NOTE — DISCHARGE NOTE PROVIDER - CARE PROVIDER_API CALL
Kerwin Guerra  Obstetrics and Gynecology  [address], [place] [address]  Phone: [phone]  Fax: [phone]  Follow Up Time:

## 2023-08-07 NOTE — DISCHARGE NOTE PROVIDER - NSDCCPCAREPLAN_GEN_ALL_CORE_FT
PRINCIPAL DISCHARGE DIAGNOSIS  Diagnosis: Acute pyelonephritis  Assessment and Plan of Treatment: s/p ancef x5 days, afebrile, discharge with keflex [id number], f/u in the office in 1 week , tylenol PO  for fever and pain      SECONDARY DISCHARGE DIAGNOSES  Diagnosis: Pregnant  Assessment and Plan of Treatment:      PRINCIPAL DISCHARGE DIAGNOSIS  Diagnosis: Acute pyelonephritis  Assessment and Plan of Treatment: s/p ancef x5 days, afebrile, discharge with keflex [id number], f/u in the office on THursday as scheduled , tylenol PO  for fever and pain      SECONDARY DISCHARGE DIAGNOSES  Diagnosis: Pregnant  Assessment and Plan of Treatment:

## 2023-08-10 ENCOUNTER — NON-APPOINTMENT (OUTPATIENT)
Age: 23
End: 2023-08-10

## 2023-08-10 ENCOUNTER — APPOINTMENT (OUTPATIENT)
Dept: OBGYN | Facility: CLINIC | Age: 23
End: 2023-08-10
Payer: MEDICAID

## 2023-08-10 ENCOUNTER — LABORATORY RESULT (OUTPATIENT)
Age: 23
End: 2023-08-10

## 2023-08-10 VITALS — WEIGHT: 166 LBS | SYSTOLIC BLOOD PRESSURE: 105 MMHG | DIASTOLIC BLOOD PRESSURE: 62 MMHG

## 2023-08-10 PROCEDURE — 0502F SUBSEQUENT PRENATAL CARE: CPT

## 2023-08-10 RX ORDER — CLOTRIMAZOLE AND BETAMETHASONE DIPROPIONATE 10; .5 MG/G; MG/G
1-0.05 CREAM TOPICAL TWICE DAILY
Qty: 1 | Refills: 2 | Status: ACTIVE | COMMUNITY
Start: 2023-08-10 | End: 1900-01-01

## 2023-08-16 RX ORDER — PNV NO.118/IRON FUMARATE/FA 29 MG-1 MG
TABLET,CHEWABLE ORAL DAILY
Qty: 30 | Refills: 11 | Status: ACTIVE | COMMUNITY
Start: 2023-08-16 | End: 1900-01-01

## 2023-08-21 ENCOUNTER — APPOINTMENT (OUTPATIENT)
Dept: ANTEPARTUM | Facility: CLINIC | Age: 23
End: 2023-08-21
Payer: MEDICAID

## 2023-08-21 ENCOUNTER — ASOB RESULT (OUTPATIENT)
Age: 23
End: 2023-08-21

## 2023-08-21 PROCEDURE — 76811 OB US DETAILED SNGL FETUS: CPT

## 2023-09-05 ENCOUNTER — NON-APPOINTMENT (OUTPATIENT)
Age: 23
End: 2023-09-05

## 2023-09-07 ENCOUNTER — LABORATORY RESULT (OUTPATIENT)
Age: 23
End: 2023-09-07

## 2023-09-07 ENCOUNTER — APPOINTMENT (OUTPATIENT)
Dept: OBGYN | Facility: CLINIC | Age: 23
End: 2023-09-07
Payer: MEDICAID

## 2023-09-07 VITALS — WEIGHT: 167 LBS | DIASTOLIC BLOOD PRESSURE: 63 MMHG | SYSTOLIC BLOOD PRESSURE: 101 MMHG

## 2023-09-07 DIAGNOSIS — R39.9 UNSPECIFIED SYMPTOMS AND SIGNS INVOLVING THE GENITOURINARY SYSTEM: ICD-10-CM

## 2023-09-07 PROCEDURE — 0502F SUBSEQUENT PRENATAL CARE: CPT

## 2023-09-07 RX ORDER — NITROFURANTOIN (MONOHYDRATE/MACROCRYSTALS) 25; 75 MG/1; MG/1
100 CAPSULE ORAL
Qty: 14 | Refills: 0 | Status: COMPLETED | COMMUNITY
Start: 2023-09-07 | End: 2023-09-14

## 2023-09-10 ENCOUNTER — OUTPATIENT (OUTPATIENT)
Dept: OUTPATIENT SERVICES | Facility: HOSPITAL | Age: 23
LOS: 1 days | End: 2023-09-10
Payer: MEDICAID

## 2023-09-10 ENCOUNTER — RESULT REVIEW (OUTPATIENT)
Age: 23
End: 2023-09-10

## 2023-09-10 DIAGNOSIS — Z3A.00 WEEKS OF GESTATION OF PREGNANCY NOT SPECIFIED: ICD-10-CM

## 2023-09-10 DIAGNOSIS — O26.899 OTHER SPECIFIED PREGNANCY RELATED CONDITIONS, UNSPECIFIED TRIMESTER: ICD-10-CM

## 2023-09-10 LAB
APPEARANCE UR: ABNORMAL
BILIRUB UR-MCNC: NEGATIVE — SIGNIFICANT CHANGE UP
COLOR SPEC: YELLOW — SIGNIFICANT CHANGE UP
DIFF PNL FLD: ABNORMAL
GLUCOSE UR QL: NEGATIVE MG/DL — SIGNIFICANT CHANGE UP
KETONES UR-MCNC: 40 MG/DL
LEUKOCYTE ESTERASE UR-ACNC: ABNORMAL
NITRITE UR-MCNC: POSITIVE
PH UR: 7 — SIGNIFICANT CHANGE UP (ref 5–8)
PROT UR-MCNC: 100 MG/DL
SP GR SPEC: 1.01 — SIGNIFICANT CHANGE UP (ref 1–1.03)
UROBILINOGEN FLD QL: 1 MG/DL — SIGNIFICANT CHANGE UP (ref 0.2–1)

## 2023-09-10 PROCEDURE — 76775 US EXAM ABDO BACK WALL LIM: CPT | Mod: 26

## 2023-09-10 PROCEDURE — 59025 FETAL NON-STRESS TEST: CPT | Mod: 26

## 2023-09-10 PROCEDURE — 76775 US EXAM ABDO BACK WALL LIM: CPT

## 2023-09-10 PROCEDURE — G0463: CPT

## 2023-09-10 PROCEDURE — 76818 FETAL BIOPHYS PROFILE W/NST: CPT

## 2023-09-10 PROCEDURE — 59025 FETAL NON-STRESS TEST: CPT

## 2023-09-10 PROCEDURE — 81001 URINALYSIS AUTO W/SCOPE: CPT

## 2023-09-10 PROCEDURE — 87086 URINE CULTURE/COLONY COUNT: CPT

## 2023-09-10 PROCEDURE — 76818 FETAL BIOPHYS PROFILE W/NST: CPT | Mod: 26

## 2023-09-10 PROCEDURE — 36415 COLL VENOUS BLD VENIPUNCTURE: CPT

## 2023-09-10 PROCEDURE — 96360 HYDRATION IV INFUSION INIT: CPT

## 2023-09-10 RX ORDER — SODIUM CHLORIDE 9 MG/ML
1000 INJECTION, SOLUTION INTRAVENOUS ONCE
Refills: 0 | Status: COMPLETED | OUTPATIENT
Start: 2023-09-10 | End: 2023-09-10

## 2023-09-10 RX ORDER — ACETAMINOPHEN 500 MG
1000 TABLET ORAL ONCE
Refills: 0 | Status: COMPLETED | OUTPATIENT
Start: 2023-09-10 | End: 2023-09-10

## 2023-09-10 RX ORDER — CEFTRIAXONE 500 MG/1
1000 INJECTION, POWDER, FOR SOLUTION INTRAMUSCULAR; INTRAVENOUS EVERY 24 HOURS
Refills: 0 | Status: DISCONTINUED | OUTPATIENT
Start: 2023-09-10 | End: 2023-09-24

## 2023-09-10 RX ORDER — NITROFURANTOIN (MONOHYDRATE/MACROCRYSTALS) 25; 75 MG/1; MG/1
100 CAPSULE ORAL
Qty: 60 | Refills: 0 | Status: ACTIVE | COMMUNITY
Start: 2023-09-10 | End: 1900-01-01

## 2023-09-10 RX ORDER — NITROFURANTOIN MACROCRYSTAL 50 MG
1 CAPSULE ORAL
Qty: 60 | Refills: 0
Start: 2023-09-10 | End: 2023-10-09

## 2023-09-10 RX ADMIN — Medication 400 MILLIGRAM(S): at 10:17

## 2023-09-10 RX ADMIN — CEFTRIAXONE 100 MILLIGRAM(S): 500 INJECTION, POWDER, FOR SOLUTION INTRAMUSCULAR; INTRAVENOUS at 10:45

## 2023-09-10 RX ADMIN — SODIUM CHLORIDE 1000 MILLILITER(S): 9 INJECTION, SOLUTION INTRAVENOUS at 10:10

## 2023-09-13 LAB
CULTURE RESULTS: SIGNIFICANT CHANGE UP
SPECIMEN SOURCE: SIGNIFICANT CHANGE UP

## 2023-10-05 ENCOUNTER — NON-APPOINTMENT (OUTPATIENT)
Age: 23
End: 2023-10-05

## 2023-10-05 ENCOUNTER — APPOINTMENT (OUTPATIENT)
Dept: OBGYN | Facility: CLINIC | Age: 23
End: 2023-10-05
Payer: MEDICAID

## 2023-10-05 VITALS — SYSTOLIC BLOOD PRESSURE: 105 MMHG | WEIGHT: 167 LBS | DIASTOLIC BLOOD PRESSURE: 67 MMHG

## 2023-10-05 DIAGNOSIS — B37.31 ACUTE CANDIDIASIS OF VULVA AND VAGINA: ICD-10-CM

## 2023-10-05 PROCEDURE — 0502F SUBSEQUENT PRENATAL CARE: CPT

## 2023-10-05 PROCEDURE — 99212 OFFICE O/P EST SF 10 MIN: CPT | Mod: 25

## 2023-10-05 RX ORDER — TERCONAZOLE 8 MG/G
0.8 CREAM VAGINAL
Qty: 1 | Refills: 0 | Status: ACTIVE | COMMUNITY
Start: 2023-10-05 | End: 1900-01-01

## 2023-10-13 ENCOUNTER — LABORATORY RESULT (OUTPATIENT)
Age: 23
End: 2023-10-13

## 2023-10-16 DIAGNOSIS — O99.810 ABNORMAL GLUCOSE COMPLICATING PREGNANCY: ICD-10-CM

## 2023-11-03 ENCOUNTER — APPOINTMENT (OUTPATIENT)
Dept: OBGYN | Facility: CLINIC | Age: 23
End: 2023-11-03
Payer: MEDICAID

## 2023-11-03 ENCOUNTER — ASOB RESULT (OUTPATIENT)
Age: 23
End: 2023-11-03

## 2023-11-03 ENCOUNTER — APPOINTMENT (OUTPATIENT)
Dept: ANTEPARTUM | Facility: CLINIC | Age: 23
End: 2023-11-03

## 2023-11-03 PROCEDURE — 76816 OB US FOLLOW-UP PER FETUS: CPT

## 2023-11-06 ENCOUNTER — NON-APPOINTMENT (OUTPATIENT)
Age: 23
End: 2023-11-06

## 2023-11-06 ENCOUNTER — APPOINTMENT (OUTPATIENT)
Dept: OBGYN | Facility: CLINIC | Age: 23
End: 2023-11-06
Payer: MEDICAID

## 2023-11-06 VITALS — WEIGHT: 167 LBS | SYSTOLIC BLOOD PRESSURE: 105 MMHG | DIASTOLIC BLOOD PRESSURE: 61 MMHG

## 2023-11-06 PROCEDURE — 0502F SUBSEQUENT PRENATAL CARE: CPT

## 2023-11-14 ENCOUNTER — LABORATORY RESULT (OUTPATIENT)
Age: 23
End: 2023-11-14

## 2023-11-20 ENCOUNTER — APPOINTMENT (OUTPATIENT)
Dept: OBGYN | Facility: CLINIC | Age: 23
End: 2023-11-20
Payer: MEDICAID

## 2023-11-20 VITALS
OXYGEN SATURATION: 98 % | SYSTOLIC BLOOD PRESSURE: 104 MMHG | BODY MASS INDEX: 31.91 KG/M2 | DIASTOLIC BLOOD PRESSURE: 63 MMHG | WEIGHT: 169 LBS | HEART RATE: 78 BPM | HEIGHT: 61 IN

## 2023-11-20 PROCEDURE — 0502F SUBSEQUENT PRENATAL CARE: CPT

## 2023-12-04 ENCOUNTER — APPOINTMENT (OUTPATIENT)
Dept: OBGYN | Facility: CLINIC | Age: 23
End: 2023-12-04
Payer: MEDICAID

## 2023-12-04 VITALS
SYSTOLIC BLOOD PRESSURE: 112 MMHG | HEART RATE: 94 BPM | DIASTOLIC BLOOD PRESSURE: 73 MMHG | WEIGHT: 172 LBS | OXYGEN SATURATION: 99 %

## 2023-12-04 PROCEDURE — 0502F SUBSEQUENT PRENATAL CARE: CPT

## 2023-12-11 ENCOUNTER — LABORATORY RESULT (OUTPATIENT)
Age: 23
End: 2023-12-11

## 2023-12-11 ENCOUNTER — APPOINTMENT (OUTPATIENT)
Dept: OBGYN | Facility: CLINIC | Age: 23
End: 2023-12-11
Payer: MEDICAID

## 2023-12-11 VITALS
WEIGHT: 175 LBS | HEART RATE: 93 BPM | DIASTOLIC BLOOD PRESSURE: 64 MMHG | SYSTOLIC BLOOD PRESSURE: 106 MMHG | OXYGEN SATURATION: 100 %

## 2023-12-11 PROCEDURE — 0502F SUBSEQUENT PRENATAL CARE: CPT

## 2023-12-14 ENCOUNTER — NON-APPOINTMENT (OUTPATIENT)
Age: 23
End: 2023-12-14

## 2023-12-18 ENCOUNTER — APPOINTMENT (OUTPATIENT)
Dept: OBGYN | Facility: CLINIC | Age: 23
End: 2023-12-18
Payer: MEDICAID

## 2023-12-18 VITALS
DIASTOLIC BLOOD PRESSURE: 66 MMHG | WEIGHT: 175 LBS | HEART RATE: 64 BPM | SYSTOLIC BLOOD PRESSURE: 106 MMHG | OXYGEN SATURATION: 99 %

## 2023-12-18 PROCEDURE — 0502F SUBSEQUENT PRENATAL CARE: CPT

## 2023-12-28 ENCOUNTER — OUTPATIENT (OUTPATIENT)
Dept: OUTPATIENT SERVICES | Facility: HOSPITAL | Age: 23
LOS: 1 days | End: 2023-12-28
Payer: MEDICAID

## 2023-12-28 ENCOUNTER — APPOINTMENT (OUTPATIENT)
Dept: OBGYN | Facility: CLINIC | Age: 23
End: 2023-12-28
Payer: MEDICAID

## 2023-12-28 ENCOUNTER — RESULT REVIEW (OUTPATIENT)
Age: 23
End: 2023-12-28

## 2023-12-28 ENCOUNTER — ASOB RESULT (OUTPATIENT)
Age: 23
End: 2023-12-28

## 2023-12-28 VITALS — SYSTOLIC BLOOD PRESSURE: 110 MMHG | WEIGHT: 176 LBS | DIASTOLIC BLOOD PRESSURE: 72 MMHG

## 2023-12-28 VITALS
SYSTOLIC BLOOD PRESSURE: 117 MMHG | HEART RATE: 87 BPM | OXYGEN SATURATION: 98 % | DIASTOLIC BLOOD PRESSURE: 75 MMHG | RESPIRATION RATE: 16 BRPM | TEMPERATURE: 98 F

## 2023-12-28 DIAGNOSIS — O26.899 OTHER SPECIFIED PREGNANCY RELATED CONDITIONS, UNSPECIFIED TRIMESTER: ICD-10-CM

## 2023-12-28 DIAGNOSIS — Z3A.00 WEEKS OF GESTATION OF PREGNANCY NOT SPECIFIED: ICD-10-CM

## 2023-12-28 LAB
APPEARANCE UR: ABNORMAL
APPEARANCE UR: ABNORMAL
BACTERIA # UR AUTO: ABNORMAL /HPF
BACTERIA # UR AUTO: ABNORMAL /HPF
BILIRUB UR-MCNC: NEGATIVE — SIGNIFICANT CHANGE UP
BILIRUB UR-MCNC: NEGATIVE — SIGNIFICANT CHANGE UP
COLOR SPEC: YELLOW — SIGNIFICANT CHANGE UP
COLOR SPEC: YELLOW — SIGNIFICANT CHANGE UP
DIFF PNL FLD: NEGATIVE — SIGNIFICANT CHANGE UP
DIFF PNL FLD: NEGATIVE — SIGNIFICANT CHANGE UP
EPI CELLS # UR: PRESENT
EPI CELLS # UR: PRESENT
GLUCOSE UR QL: NEGATIVE MG/DL — SIGNIFICANT CHANGE UP
GLUCOSE UR QL: NEGATIVE MG/DL — SIGNIFICANT CHANGE UP
KETONES UR-MCNC: NEGATIVE MG/DL — SIGNIFICANT CHANGE UP
KETONES UR-MCNC: NEGATIVE MG/DL — SIGNIFICANT CHANGE UP
LEUKOCYTE ESTERASE UR-ACNC: ABNORMAL
LEUKOCYTE ESTERASE UR-ACNC: ABNORMAL
NITRITE UR-MCNC: NEGATIVE — SIGNIFICANT CHANGE UP
NITRITE UR-MCNC: NEGATIVE — SIGNIFICANT CHANGE UP
PH UR: 7 — SIGNIFICANT CHANGE UP (ref 5–8)
PH UR: 7 — SIGNIFICANT CHANGE UP (ref 5–8)
PROT UR-MCNC: 30 MG/DL
PROT UR-MCNC: 30 MG/DL
RBC CASTS # UR COMP ASSIST: 4 /HPF — SIGNIFICANT CHANGE UP (ref 0–4)
RBC CASTS # UR COMP ASSIST: 4 /HPF — SIGNIFICANT CHANGE UP (ref 0–4)
SP GR SPEC: 1.02 — SIGNIFICANT CHANGE UP (ref 1–1.03)
SP GR SPEC: 1.02 — SIGNIFICANT CHANGE UP (ref 1–1.03)
UROBILINOGEN FLD QL: 1 MG/DL — SIGNIFICANT CHANGE UP (ref 0.2–1)
UROBILINOGEN FLD QL: 1 MG/DL — SIGNIFICANT CHANGE UP (ref 0.2–1)
WBC UR QL: 15 /HPF — HIGH (ref 0–5)
WBC UR QL: 15 /HPF — HIGH (ref 0–5)

## 2023-12-28 PROCEDURE — 76815 OB US LIMITED FETUS(S): CPT | Mod: 26

## 2023-12-28 PROCEDURE — 76819 FETAL BIOPHYS PROFIL W/O NST: CPT | Mod: 26

## 2023-12-28 PROCEDURE — 99213 OFFICE O/P EST LOW 20 MIN: CPT | Mod: 25

## 2023-12-28 PROCEDURE — 59025 FETAL NON-STRESS TEST: CPT

## 2023-12-28 PROCEDURE — G0463: CPT

## 2023-12-28 PROCEDURE — 76819 FETAL BIOPHYS PROFIL W/O NST: CPT

## 2023-12-28 PROCEDURE — 76815 OB US LIMITED FETUS(S): CPT

## 2023-12-28 PROCEDURE — 81001 URINALYSIS AUTO W/SCOPE: CPT

## 2023-12-28 PROCEDURE — 0502F SUBSEQUENT PRENATAL CARE: CPT

## 2023-12-28 PROCEDURE — 59025 FETAL NON-STRESS TEST: CPT | Mod: 26

## 2023-12-28 RX ORDER — FAMOTIDINE 10 MG/ML
20 INJECTION INTRAVENOUS DAILY
Refills: 0 | Status: DISCONTINUED | OUTPATIENT
Start: 2023-12-28 | End: 2023-12-28

## 2023-12-28 NOTE — OB PROVIDER TRIAGE NOTE - ADDITIONAL INSTRUCTIONS
discharge home today  see your doctor in the office routine ob visit  water 2liters a day to prevent dehydration  fetal kick count in [place]an   continue prenatal vitamins   if water breaks contractions noted vaginal bleeding noted return to delivery room  daily check the baby movements with fetal kick count discharge home today  see your doctor in the office routine ob visit  water 2liters a day to prevent dehydration  fetal kick count in [place]   continue prenatal vitamins   if water breaks contractions noted vaginal bleeding noted return to delivery room  daily check the baby movements with fetal kick count

## 2023-12-28 NOTE — OB RN TRIAGE NOTE - NS_DISCHARGEPRINT_OBGYN_ALL_OB
[place] [facility] OB Triage Discharge Instructions [place] [facility] OB Triage Discharge Instructions

## 2023-12-28 NOTE — OB PROVIDER TRIAGE NOTE - NSHPPHYSICALEXAM_GEN_ALL_CORE
Vitals:     Gen: well-appearing, NAD, A&Ox3   Chest: CTA b/l anterior, NAD   Abd: soft, non-tender, , no rebound tenderness/ rigidity/ guarding  Extremities: no calf tenderness/swelling     SVE: 1/50/-3/ vtx soft  SSE: deferred     Tracing: baseline 145, moderate variability, + accels, no decels, reactive   [place]: uterine irritability Vitals:     Gen: well-appearing, NAD, A&Ox3   Chest: CTA b/l anterior, NAD   Abd: soft, non-tender, , no rebound tenderness/ rigidity/ guarding  Extremities: no calf tenderness/swelling     SVE: 1/50/-3/ vtx soft  SSE: deferred     Tracing: baseline 145, moderate variability, + accels, no decels, reactive   [place]: uterine irritability Vital Signs Last 24 Hrs  T(C): 36.8 (28 Dec 2023 12:28), Max: 36.8 (28 Dec 2023 12:28)  T(F): 98.2 (28 Dec 2023 12:28), Max: 98.2 (28 Dec 2023 12:28)  HR: 87 (28 Dec 2023 12:28) (87 - 87)  BP: 117/75 (28 Dec 2023 12:28) (117/75 - 117/75)  BP(mean): --  RR: 16 (28 Dec 2023 12:28) (16 - 16)  SpO2: 98% (28 Dec 2023 12:28) (98% - 98%)        Gen: well-appearing, NAD, A&Ox3   Chest: CTA b/l anterior, NAD   Abd: soft, non-tender, , no rebound tenderness/ rigidity/ guarding  Extremities: no calf tenderness/swelling     SVE: 1/50/-3/ vtx soft  no evidence of LOF/vag bleeding  SSE: deferred     Tracing: baseline 145, moderate variability, + accels, no decels, reactive   [place]: uterine irritability Vital Signs Last 24 Hrs  T(C): 36.8 (28 Dec 2023 12:28), Max: 36.8 (28 Dec 2023 12:28)  T(F): 98.2 (28 Dec 2023 12:28), Max: 98.2 (28 Dec 2023 12:28)  HR: 87 (28 Dec 2023 12:28) (87 - 87)  BP: 117/75 (28 Dec 2023 12:28) (117/75 - 117/75)  BP(mean): --  RR: 16 (28 Dec 2023 12:28) (16 - 16)  SpO2: 98% (28 Dec 2023 12:28) (98% - 98%)        Gen: well-appearing, NAD, A&Ox3   Chest: CTA b/l anterior, NAD   Abd: soft, non-tender, , no rebound tenderness/ rigidity/ guarding  Extremities: no calf tenderness/swelling     SVE: 1/50/-3/ vtx soft  no evidence of LOF/vag bleeding  SSE: deferred     Tracing: baseline 145, moderate variability, + accels, no decels, reactive   [place]: uterine irritability

## 2023-12-28 NOTE — OB PROVIDER TRIAGE NOTE - NSOBPROVIDERNOTE_OBGYN_ALL_OB_FT
22 y/o YARI: 24 LMP: 3/25/23 @ 38 weeks 4 days  sent in from office for decreased FM since yesterday morning. r/o fetal sleep cycle. r/o UTI   Tracing reactive     - prolonged maternal/fetal monitoring   - obtain BPP ( ALDO)   - NST   - UA to r/o UTI   - seen with ORTIZ Cardenas   - D/w Dr. Guerra 24 y/o YARI: 24 LMP: 3/25/23 @ 38 weeks 4 days  sent in from office for decreased FM since yesterday morning. r/o fetal sleep cycle. r/o UTI   Tracing reactive     - prolonged maternal/fetal monitoring   - obtain BPP ( ALDO)   - NST   - UA to r/o UTI   - seen with ORTIZ Cardenas   - D/w Dr. Guerra 22 y/o YARI: 24 LMP: 3/25/23 @ 38 weeks 4 days  sent in from office for decreased FM since yesterday morning. r/o fetal sleep cycle. r/o UTI   Tracing reactive     - prolonged maternal/fetal monitoring   - obtain BPP ( ALDO)   - NST -reactive  - UA to r/o UTI   - seen with ORTIZ Cardenas   - D/w Dr. Guerra

## 2023-12-28 NOTE — OB PROVIDER TRIAGE NOTE - HISTORY OF PRESENT ILLNESS
24 y/o YARI: 24 LMP: 3/25/23  @ 38 weeks 4 days sent in from office for decreased FM. States that she has not felt baby movement since yesterday morning. Denies vaginal bleeding, contractions or loss of fluid.   Endorses dysuria and increased urinary frequency states that she has had " a few UTIs" during this pregnancy. Endorses some chest discomfort that she describes as occasional burning. Has had nausea and episode of emesis x 1 today but states that she has been nauseous and vomiting for the past few weeks.  last meal 10am.   Denies SOB, difficulty breathing, leg pain, abdominal pain.     OB:  YARI: 25   LMP: 3/25/23    Dr. Guerra 1st visit: unknown  Last visit: 23   GYN: denies fibroids, ovarian cysts, + HPV 2023   PMHx: nephrolithiasis, UTIs   PSHX: none   Allergies: NKDA   Meds: prenatals   Social: single; Denies EtoH, tobacco use, and illicit drug use   Psych: denies anxiety, depression, PTSD  manpreet [id number]  22 y/o YARI: 24 LMP: 3/25/23  @ 38 weeks 4 days sent in from office for decreased FM. States that she has not felt baby movement since yesterday morning. Denies vaginal bleeding, contractions or loss of fluid.   Endorses dysuria and increased urinary frequency states that she has had " a few UTIs" during this pregnancy. Endorses some chest discomfort that she describes as occasional burning. Has had nausea and episode of emesis x 1 today but states that she has been nauseous and vomiting for the past few weeks.  last meal 10am.   Denies SOB, difficulty breathing, leg pain, abdominal pain.     OB:  YARI: 25   LMP: 3/25/23    Dr. Guerra 1st visit: unknown  Last visit: 23   GYN: denies fibroids, ovarian cysts, + HPV 2023   PMHx: nephrolithiasis, UTIs   PSHX: none   Allergies: NKDA   Meds: prenatals   Social: single; Denies EtoH, tobacco use, and illicit drug use   Psych: denies anxiety, depression, PTSD  manpreet [id number]  24 y/o AYRI: 24 LMP: 3/25/23  @ 38 weeks 4 days sent in from office for decreased FM. States that she has not felt baby movement since yesterday morning. Denies vaginal bleeding, contractions or loss of fluid.   Endorses dysuria and increased urinary frequency states that she has had " a few UTIs" during this pregnancy. Endorses some chest discomfort that she describes as occasional burning. Has had nausea and episode of emesis x 1 today but states that she has been nauseous and vomiting for the past few weeks.  last meal 10am.   Denies SOB, difficulty breathing, leg pain, abdominal pain.     OB:  YARI: 25   LMP: 3/25/23    Dr. Guerra 1st visit: unknown  Last visit: 23   GYN: denies fibroids, ovarian cysts, + HPV 2023   PMHx: nephrolithiasis, UTIs   PSHX: none   Allergies: NKDA   Meds: prenatals   Social: single; Denies EtoH, tobacco use, and illicit drug use   Psych: denies anxiety, depression, PTSD

## 2023-12-28 NOTE — OB RN TRIAGE NOTE - FALL HARM RISK - UNIVERSAL INTERVENTIONS
Bed in lowest position, wheels locked, appropriate side rails in place/Call bell, personal items and telephone in reach/Instruct patient to call for assistance before getting out of bed or chair/Non-slip footwear when patient is out of bed/[place] to call system/Physically safe environment - no spills, clutter or unnecessary equipment/Purposeful Proactive Rounding/Room/bathroom lighting operational, light cord in reach Bed in lowest position, wheels locked, appropriate side rails in place/Call bell, personal items and telephone in reach/Instruct patient to call for assistance before getting out of bed or chair/Non-slip footwear when patient is out of bed/[place] to call system/Physically safe environment - no spills, clutter or unnecessary equipment/Purposeful Proactive Rounding/Room/bathroom lighting operational, light cord in reach

## 2023-12-28 NOTE — OB PROVIDER TRIAGE NOTE - NSGENETICTESTING_OBGYN_ALL_OB
Tolerated injection well. Reviewed therapy plan, offered education material and/or discharge material, reviewed medication information and signs and symptoms  and educated on possible side effects, verbalizes good knowledge of current plan patient verbalizes understanding, and has no signs or symptoms to report at this time. Patient discharged. Patient alert and oriented x3. No distress noted. Vital signs stable. Patient denies any new or worsening pain. Patient denies any needs. All questions answered. Next appointment scheduled. Declines copy of AVS. Patient did not stay the physician ordered wait after Nucala injection, instructed on importance of staying and patient declines . Not applicable

## 2023-12-29 ENCOUNTER — NON-APPOINTMENT (OUTPATIENT)
Age: 23
End: 2023-12-29

## 2023-12-29 DIAGNOSIS — R07.89 OTHER CHEST PAIN: ICD-10-CM

## 2023-12-29 DIAGNOSIS — Z87.440 PERSONAL HISTORY OF URINARY (TRACT) INFECTIONS: ICD-10-CM

## 2023-12-29 DIAGNOSIS — O26.893 OTHER SPECIFIED PREGNANCY RELATED CONDITIONS, THIRD TRIMESTER: ICD-10-CM

## 2023-12-29 DIAGNOSIS — R35.0 FREQUENCY OF MICTURITION: ICD-10-CM

## 2023-12-29 DIAGNOSIS — Z87.442 PERSONAL HISTORY OF URINARY CALCULI: ICD-10-CM

## 2023-12-29 DIAGNOSIS — O21.2 LATE VOMITING OF PREGNANCY: ICD-10-CM

## 2023-12-29 DIAGNOSIS — Z3A.38 38 WEEKS GESTATION OF PREGNANCY: ICD-10-CM

## 2023-12-29 DIAGNOSIS — R30.0 DYSURIA: ICD-10-CM

## 2023-12-29 DIAGNOSIS — O36.8130 DECREASED FETAL MOVEMENTS, THIRD TRIMESTER, NOT APPLICABLE OR UNSPECIFIED: ICD-10-CM

## 2024-01-03 ENCOUNTER — OUTPATIENT (OUTPATIENT)
Dept: OUTPATIENT SERVICES | Facility: HOSPITAL | Age: 24
LOS: 1 days | End: 2024-01-03
Payer: MEDICAID

## 2024-01-03 VITALS
SYSTOLIC BLOOD PRESSURE: 114 MMHG | DIASTOLIC BLOOD PRESSURE: 72 MMHG | OXYGEN SATURATION: 99 % | RESPIRATION RATE: 16 BRPM | TEMPERATURE: 98 F | HEART RATE: 88 BPM

## 2024-01-03 DIAGNOSIS — O26.899 OTHER SPECIFIED PREGNANCY RELATED CONDITIONS, UNSPECIFIED TRIMESTER: ICD-10-CM

## 2024-01-03 PROBLEM — B97.7 PAPILLOMAVIRUS AS THE CAUSE OF DISEASES CLASSIFIED ELSEWHERE: Chronic | Status: ACTIVE | Noted: 2023-12-28

## 2024-01-03 PROCEDURE — 59025 FETAL NON-STRESS TEST: CPT

## 2024-01-03 PROCEDURE — G0463: CPT

## 2024-01-03 NOTE — OB PROVIDER TRIAGE NOTE - PLAN OF CARE
22 yo  at 39w3d with vaginal discharge c/w mucous plug, not inlabor, NST reactive, AFVSS  - dc home with labor precautions  - kick counts reviewed  - f/u in office  as scheduled  - d/w Dr. Martinez  - T reviewed by Dr. Gauthier prior to dc 24 yo  at 39w3d with vaginal discharge c/w mucous plug, not inlabor, NST reactive, AFVSS  - dc home with labor precautions  - kick counts reviewed  - f/u in office  as scheduled  - d/w Dr. Martinez  - T reviewed by Dr. Gauthier prior to dc

## 2024-01-03 NOTE — OB RN TRIAGE NOTE - FALL HARM RISK - UNIVERSAL INTERVENTIONS
Bed in lowest position, wheels locked, appropriate side rails in place/Call bell, personal items and telephone in reach/Instruct patient to call for assistance before getting out of bed or chair/Non-slip footwear when patient is out of bed/[place] to call system/Physically safe environment - no spills, clutter or unnecessary equipment/Purposeful Proactive Rounding/Room/bathroom lighting operational, light cord in reach Bed in lowest position, wheels locked, appropriate side rails in place/Call bell, personal items and telephone in reach/Instruct patient to call for assistance before getting out of bed or chair/Non-slip footwear when patient is out of bed/[place] to call system/Physically safe environment - no spills, clutter or unnecessary equipment/Purposeful Proactive Rounding/Room/bathroom lighting operational, light cord in reach

## 2024-01-03 NOTE — OB PROVIDER TRIAGE NOTE - NSOBPROVIDERNOTE_OBGYN_ALL_OB_FT
22 yo  at 39w3d with vaginal discharge c/w mucous plug, not inlabor, NST reactive, AFVSS  - dc home with labor precautions  - kick counts reviewed  - f/u in office  as scheduled  - d/w Dr. Martinez  - T reviewed by Dr. Gauthier prior to dc

## 2024-01-03 NOTE — OB PROVIDER TRIAGE NOTE - ADDITIONAL INSTRUCTIONS
- dc home with labor precautions  - kick counts reviewed  - f/u in office Friday 1/5 as scheduled  - d/w Dr. Martinez  - [facility] reviewed by Dr. Gauthier prior to dc - dc home with labor precautions  - kick counts reviewed  - f/u in office Friday 1/5 as scheduled  - d/w Dr. Martinez  - [facility] reviewed by Dr. Gauthier prior to dc

## 2024-01-03 NOTE — OB PROVIDER TRIAGE NOTE - NSHPPHYSICALEXAM_GEN_ALL_CORE
Vital Signs Last 24 Hrs  T(C): 36.6 (03 Jan 2024 16:27), Max: 36.6 (03 Jan 2024 16:27)  T(F): 97.8 (03 Jan 2024 16:27), Max: 97.8 (03 Jan 2024 16:27)  HR: 88 (03 Jan 2024 16:27) (88 - 88)  BP: 114/72 (03 Jan 2024 16:27) (114/72 - 114/72)  BP(mean): --  RR: 16 (03 Jan 2024 16:27) (16 - 16)  SpO2: 99% (03 Jan 2024 16:27) (99% - 99%)    Parameters below as of 03 Jan 2024 16:27  Patient On (Oxygen Delivery Method): room air

## 2024-01-03 NOTE — OB PROVIDER TRIAGE NOTE - HISTORY OF PRESENT ILLNESS
24 yo  at 39.3 wks GA LMP 3/25/23 with brown mucous discharge. No CTX. No LOF. +spotting. +FM    PNC with Dr. Guerra- uncomplicated    POBHx: denies  PGYNHx: h/o abnl PAP in  +HPV, for f/u PP, no C/F, no STI  PMHx: denies  PSHx: denies  Meds: PNV  Allergies: NKDA  SHx: no T/E/D  FHx: denies  ROS: as above

## 2024-01-03 NOTE — OB RN TRIAGE NOTE - NS_MEMBRANES_OBGYN_ALL_OB
Impression: Other chronic allergic conjunctivitis: H10.45 OU. Plan: Discussed diagnosis with patient. Recommend OTC oral antihistamine, and Ketotifen 1 drop bid ou, prn. Rub excess into lids. Recommend refrigerating drops. Intact

## 2024-01-04 DIAGNOSIS — O99.891 OTHER SPECIFIED DISEASES AND CONDITIONS COMPLICATING PREGNANCY: ICD-10-CM

## 2024-01-04 DIAGNOSIS — Z3A.39 39 WEEKS GESTATION OF PREGNANCY: ICD-10-CM

## 2024-01-04 DIAGNOSIS — N89.8 OTHER SPECIFIED NONINFLAMMATORY DISORDERS OF VAGINA: ICD-10-CM

## 2024-01-05 ENCOUNTER — OUTPATIENT (OUTPATIENT)
Dept: OUTPATIENT SERVICES | Facility: HOSPITAL | Age: 24
LOS: 1 days | End: 2024-01-05
Payer: MEDICAID

## 2024-01-05 ENCOUNTER — APPOINTMENT (OUTPATIENT)
Dept: OBGYN | Facility: CLINIC | Age: 24
End: 2024-01-05
Payer: MEDICAID

## 2024-01-05 VITALS
OXYGEN SATURATION: 99 % | DIASTOLIC BLOOD PRESSURE: 71 MMHG | SYSTOLIC BLOOD PRESSURE: 111 MMHG | HEART RATE: 78 BPM | WEIGHT: 178 LBS

## 2024-01-05 DIAGNOSIS — O26.899 OTHER SPECIFIED PREGNANCY RELATED CONDITIONS, UNSPECIFIED TRIMESTER: ICD-10-CM

## 2024-01-05 PROCEDURE — 0502F SUBSEQUENT PRENATAL CARE: CPT

## 2024-01-05 PROCEDURE — G0463: CPT

## 2024-01-05 PROCEDURE — 59025 FETAL NON-STRESS TEST: CPT

## 2024-01-05 PROCEDURE — 99221 1ST HOSP IP/OBS SF/LOW 40: CPT

## 2024-01-05 NOTE — OB PROVIDER TRIAGE NOTE - HISTORY OF PRESENT ILLNESS
Patient is a 23 year old  at 39w5d who presents to triage with complaint of contractions and bleeding.  States that contractions are more frequent, but dont hurt.  Denies heavy vaginal bleeding, leakage of fluid, decreased fetal movement or any other concerns.   PNC with Dr Guerra  PMhx: Denies  Sxhx: Denies  Meds: PNV  Allergies: NKDA   OBhx; Primigravida  Gynhx; Normal menses, one partner, no stds, no cysts, no fibroids, abnormal pap with HPV  Socialhx: neg x 3, no anxiety or depression

## 2024-01-05 NOTE — OB PROVIDER TRIAGE NOTE - ADDITIONAL INSTRUCTIONS
Discharge home with strict precautions  Report back to triage with vaginal bleeding, leakage of fluid, decreased fetal movement, or any other concerns  Geraldck cate reviewed  Follow up in office as scheduled

## 2024-01-05 NOTE — OB PROVIDER TRIAGE NOTE - NS_DISCHARGEPRINT_OBGYN_ALL_OB
Citizen of [place] General OB Triage Discharge Instructions [place] General OB Triage Discharge Instructions [place] General OB Triage Discharge Instructions

## 2024-01-06 ENCOUNTER — INPATIENT (INPATIENT)
Facility: HOSPITAL | Age: 24
LOS: 2 days | Discharge: HOME | End: 2024-01-09
Attending: OBSTETRICS & GYNECOLOGY | Admitting: OBSTETRICS & GYNECOLOGY
Payer: MEDICAID

## 2024-01-06 VITALS
SYSTOLIC BLOOD PRESSURE: 112 MMHG | DIASTOLIC BLOOD PRESSURE: 67 MMHG | OXYGEN SATURATION: 98 % | TEMPERATURE: 98 F | RESPIRATION RATE: 16 BRPM | HEART RATE: 76 BPM

## 2024-01-06 VITALS
OXYGEN SATURATION: 98 % | DIASTOLIC BLOOD PRESSURE: 59 MMHG | RESPIRATION RATE: 20 BRPM | HEART RATE: 71 BPM | TEMPERATURE: 98 F | SYSTOLIC BLOOD PRESSURE: 120 MMHG

## 2024-01-06 DIAGNOSIS — O26.899 OTHER SPECIFIED PREGNANCY RELATED CONDITIONS, UNSPECIFIED TRIMESTER: ICD-10-CM

## 2024-01-06 DIAGNOSIS — Z34.80 ENCOUNTER FOR SUPERVISION OF OTHER NORMAL PREGNANCY, UNSPECIFIED TRIMESTER: ICD-10-CM

## 2024-01-06 LAB
APTT BLD: 27.2 SEC — SIGNIFICANT CHANGE UP (ref 24.5–35.6)
APTT BLD: 27.2 SEC — SIGNIFICANT CHANGE UP (ref 24.5–35.6)
BASOPHILS # BLD AUTO: 0.05 K/UL — SIGNIFICANT CHANGE UP (ref 0–0.2)
BASOPHILS # BLD AUTO: 0.05 K/UL — SIGNIFICANT CHANGE UP (ref 0–0.2)
BASOPHILS NFR BLD AUTO: 0.4 % — SIGNIFICANT CHANGE UP (ref 0–2)
BASOPHILS NFR BLD AUTO: 0.4 % — SIGNIFICANT CHANGE UP (ref 0–2)
EOSINOPHIL # BLD AUTO: 0.04 K/UL — SIGNIFICANT CHANGE UP (ref 0–0.5)
EOSINOPHIL # BLD AUTO: 0.04 K/UL — SIGNIFICANT CHANGE UP (ref 0–0.5)
EOSINOPHIL NFR BLD AUTO: 0.4 % — SIGNIFICANT CHANGE UP (ref 0–6)
EOSINOPHIL NFR BLD AUTO: 0.4 % — SIGNIFICANT CHANGE UP (ref 0–6)
FIBRINOGEN PPP-MCNC: 633 MG/DL — HIGH (ref 200–475)
FIBRINOGEN PPP-MCNC: 633 MG/DL — HIGH (ref 200–475)
HCT VFR BLD CALC: 32.8 % — LOW (ref 34.5–45)
HCT VFR BLD CALC: 32.8 % — LOW (ref 34.5–45)
HGB BLD-MCNC: 10.7 G/DL — LOW (ref 11.5–15.5)
HGB BLD-MCNC: 10.7 G/DL — LOW (ref 11.5–15.5)
HIV 1 & 2 AB SERPL IA.RAPID: SIGNIFICANT CHANGE UP
HIV 1 & 2 AB SERPL IA.RAPID: SIGNIFICANT CHANGE UP
IMM GRANULOCYTES NFR BLD AUTO: 0.4 % — SIGNIFICANT CHANGE UP (ref 0–0.9)
IMM GRANULOCYTES NFR BLD AUTO: 0.4 % — SIGNIFICANT CHANGE UP (ref 0–0.9)
INR BLD: 0.91 RATIO — SIGNIFICANT CHANGE UP (ref 0.85–1.18)
INR BLD: 0.91 RATIO — SIGNIFICANT CHANGE UP (ref 0.85–1.18)
LYMPHOCYTES # BLD AUTO: 2.42 K/UL — SIGNIFICANT CHANGE UP (ref 1–3.3)
LYMPHOCYTES # BLD AUTO: 2.42 K/UL — SIGNIFICANT CHANGE UP (ref 1–3.3)
LYMPHOCYTES # BLD AUTO: 21.2 % — SIGNIFICANT CHANGE UP (ref 13–44)
LYMPHOCYTES # BLD AUTO: 21.2 % — SIGNIFICANT CHANGE UP (ref 13–44)
MCHC RBC-ENTMCNC: 26 PG — LOW (ref 27–34)
MCHC RBC-ENTMCNC: 26 PG — LOW (ref 27–34)
MCHC RBC-ENTMCNC: 32.6 GM/DL — SIGNIFICANT CHANGE UP (ref 32–36)
MCHC RBC-ENTMCNC: 32.6 GM/DL — SIGNIFICANT CHANGE UP (ref 32–36)
MCV RBC AUTO: 79.6 FL — LOW (ref 80–100)
MCV RBC AUTO: 79.6 FL — LOW (ref 80–100)
MONOCYTES # BLD AUTO: 0.57 K/UL — SIGNIFICANT CHANGE UP (ref 0–0.9)
MONOCYTES # BLD AUTO: 0.57 K/UL — SIGNIFICANT CHANGE UP (ref 0–0.9)
MONOCYTES NFR BLD AUTO: 5 % — SIGNIFICANT CHANGE UP (ref 2–14)
MONOCYTES NFR BLD AUTO: 5 % — SIGNIFICANT CHANGE UP (ref 2–14)
NEUTROPHILS # BLD AUTO: 8.3 K/UL — HIGH (ref 1.8–7.4)
NEUTROPHILS # BLD AUTO: 8.3 K/UL — HIGH (ref 1.8–7.4)
NEUTROPHILS NFR BLD AUTO: 72.6 % — SIGNIFICANT CHANGE UP (ref 43–77)
NEUTROPHILS NFR BLD AUTO: 72.6 % — SIGNIFICANT CHANGE UP (ref 43–77)
NRBC # BLD: 0 /100 WBCS — SIGNIFICANT CHANGE UP (ref 0–0)
NRBC # BLD: 0 /100 WBCS — SIGNIFICANT CHANGE UP (ref 0–0)
PLATELET # BLD AUTO: 254 K/UL — SIGNIFICANT CHANGE UP (ref 150–400)
PLATELET # BLD AUTO: 254 K/UL — SIGNIFICANT CHANGE UP (ref 150–400)
PROTHROM AB SERPL-ACNC: 10.4 SEC — SIGNIFICANT CHANGE UP (ref 9.5–13)
PROTHROM AB SERPL-ACNC: 10.4 SEC — SIGNIFICANT CHANGE UP (ref 9.5–13)
RBC # BLD: 4.12 M/UL — SIGNIFICANT CHANGE UP (ref 3.8–5.2)
RBC # BLD: 4.12 M/UL — SIGNIFICANT CHANGE UP (ref 3.8–5.2)
RBC # FLD: 13.9 % — SIGNIFICANT CHANGE UP (ref 10.3–14.5)
RBC # FLD: 13.9 % — SIGNIFICANT CHANGE UP (ref 10.3–14.5)
WBC # BLD: 11.42 K/UL — HIGH (ref 3.8–10.5)
WBC # BLD: 11.42 K/UL — HIGH (ref 3.8–10.5)
WBC # FLD AUTO: 11.42 K/UL — HIGH (ref 3.8–10.5)
WBC # FLD AUTO: 11.42 K/UL — HIGH (ref 3.8–10.5)

## 2024-01-06 RX ORDER — CHLORHEXIDINE GLUCONATE 213 G/1000ML
1 SOLUTION TOPICAL DAILY
Refills: 0 | Status: DISCONTINUED | OUTPATIENT
Start: 2024-01-06 | End: 2024-01-07

## 2024-01-06 RX ORDER — OXYTOCIN 10 UNIT/ML
333.33 VIAL (ML) INJECTION
Qty: 20 | Refills: 0 | Status: DISCONTINUED | OUTPATIENT
Start: 2024-01-06 | End: 2024-01-07

## 2024-01-06 RX ORDER — SODIUM CHLORIDE 9 MG/ML
1000 INJECTION, SOLUTION INTRAVENOUS
Refills: 0 | Status: DISCONTINUED | OUTPATIENT
Start: 2024-01-06 | End: 2024-01-07

## 2024-01-06 RX ORDER — CITRIC ACID/SODIUM CITRATE 300-500 MG
15 SOLUTION, ORAL ORAL EVERY 6 HOURS
Refills: 0 | Status: DISCONTINUED | OUTPATIENT
Start: 2024-01-06 | End: 2024-01-07

## 2024-01-06 RX ADMIN — SODIUM CHLORIDE 125 MILLILITER(S): 9 INJECTION, SOLUTION INTRAVENOUS at 23:52

## 2024-01-06 NOTE — OB RN PATIENT PROFILE - AGENT'S NAME
How Severe Is Your Skin Lesion?: moderate Have Your Skin Lesions Been Treated?: not been treated Is This A New Presentation, Or A Follow-Up?: Growths Zehra Hernandez

## 2024-01-06 NOTE — OB RN TRIAGE NOTE - FALL HARM RISK - UNIVERSAL INTERVENTIONS
Bed in lowest position, wheels locked, appropriate side rails in place/Call bell, personal items and telephone in reach/Instruct patient to call for assistance before getting out of bed or chair/Non-slip footwear when patient is out of bed/[place] to call system/Physically safe environment - no spills, clutter or unnecessary equipment/Purposeful Proactive Rounding/Room/bathroom lighting operational, light cord in reach Bed in lowest position, wheels locked, appropriate side rails in place/Call bell, personal items and telephone in reach/Instruct patient to call for assistance before getting out of bed or chair/Non-slip footwear when patient is out of bed/[place] to call system/Physically safe environment - no spills, clutter or unnecessary equipment/Purposeful Proactive Rounding/Room/bathroom lighting operational, light cord in reach

## 2024-01-06 NOTE — OB RN PATIENT PROFILE - FALL HARM RISK - UNIVERSAL INTERVENTIONS
Bed in lowest position, wheels locked, appropriate side rails in place/Call bell, personal items and telephone in reach/Instruct patient to call for assistance before getting out of bed or chair/Non-slip footwear when patient is out of bed/[place] to call system/Physically safe environment - no spills, clutter or unnecessary equipment/Purposeful Proactive Rounding/Room/bathroom lighting operational, light cord in reach Bed in lowest position, wheels locked, appropriate side rails in place/Call bell, personal items and telephone in reach/Instruct patient to call for assistance before getting out of bed or chair/Non-slip footwear when patient is out of bed/[place] to call system/Physically safe environment - no spills, clutter or unnecessary equipment/Purposeful Proactive Rounding/Room/bathroom lighting operational, light cord in reach

## 2024-01-06 NOTE — OB RN TRIAGE NOTE - FALL HARM RISK - UNIVERSAL INTERVENTIONS
Bed in lowest position, wheels locked, appropriate side rails in place/Call bell, personal items and telephone in reach/Instruct patient to call for assistance before getting out of bed or chair/Non-slip footwear when patient is out of bed/[place] to call system/Physically safe environment - no spills, clutter or unnecessary equipment/Purposeful Proactive Rounding/Room/bathroom lighting operational, light cord in reach Bed in lowest position, wheels locked, appropriate side rails in place/Call bell, personal items and telephone in reach/Instruct patient to call for assistance before getting out of bed or chair/Non-slip footwear when patient is out of bed/[place] to call system/Physically safe environment - no spills, clutter or unnecessary equipment/Purposeful Proactive Rounding/Room/bathroom lighting operational, light cord in reach

## 2024-01-06 NOTE — OB RN TRIAGE NOTE - NS_DISCHARGEPRINT_OBGYN_ALL_OB
[place] General OB Triage Discharge Instructions [place] General OB Triage Discharge Instructions

## 2024-01-07 DIAGNOSIS — Z3A.40 40 WEEKS GESTATION OF PREGNANCY: ICD-10-CM

## 2024-01-07 LAB
BLD GP AB SCN SERPL QL: SIGNIFICANT CHANGE UP
BLD GP AB SCN SERPL QL: SIGNIFICANT CHANGE UP
HBV SURFACE AG SERPL QL IA: SIGNIFICANT CHANGE UP
HBV SURFACE AG SERPL QL IA: SIGNIFICANT CHANGE UP
HIV 1+2 AB+HIV1 P24 AG SERPL QL IA: SIGNIFICANT CHANGE UP
HIV 1+2 AB+HIV1 P24 AG SERPL QL IA: SIGNIFICANT CHANGE UP
RUBV IGG SER-ACNC: 1 INDEX — SIGNIFICANT CHANGE UP
RUBV IGG SER-ACNC: 1 INDEX — SIGNIFICANT CHANGE UP
RUBV IGG SER-IMP: SIGNIFICANT CHANGE UP
RUBV IGG SER-IMP: SIGNIFICANT CHANGE UP
T PALLIDUM AB TITR SER: NEGATIVE — SIGNIFICANT CHANGE UP
T PALLIDUM AB TITR SER: NEGATIVE — SIGNIFICANT CHANGE UP

## 2024-01-07 PROCEDURE — 59400 OBSTETRICAL CARE: CPT | Mod: U9

## 2024-01-07 RX ORDER — OXYTOCIN 10 UNIT/ML
333.33 VIAL (ML) INJECTION
Qty: 20 | Refills: 0 | Status: DISCONTINUED | OUTPATIENT
Start: 2024-01-07 | End: 2024-01-07

## 2024-01-07 RX ORDER — OXYTOCIN 10 UNIT/ML
VIAL (ML) INJECTION
Qty: 30 | Refills: 0 | Status: DISCONTINUED | OUTPATIENT
Start: 2024-01-07 | End: 2024-01-07

## 2024-01-07 RX ORDER — MAGNESIUM HYDROXIDE 400 MG/1
30 TABLET, CHEWABLE ORAL
Refills: 0 | Status: DISCONTINUED | OUTPATIENT
Start: 2024-01-07 | End: 2024-01-09

## 2024-01-07 RX ORDER — CITRIC ACID/SODIUM CITRATE 300-500 MG
30 SOLUTION, ORAL ORAL EVERY 6 HOURS
Refills: 0 | Status: DISCONTINUED | OUTPATIENT
Start: 2024-01-07 | End: 2024-01-09

## 2024-01-07 RX ORDER — BENZOCAINE 10 %
1 GEL (GRAM) MUCOUS MEMBRANE EVERY 6 HOURS
Refills: 0 | Status: DISCONTINUED | OUTPATIENT
Start: 2024-01-07 | End: 2024-01-09

## 2024-01-07 RX ORDER — HYDROCORTISONE 1 %
1 OINTMENT (GRAM) TOPICAL EVERY 6 HOURS
Refills: 0 | Status: DISCONTINUED | OUTPATIENT
Start: 2024-01-07 | End: 2024-01-09

## 2024-01-07 RX ORDER — AER TRAVELER 0.5 G/1
1 SOLUTION RECTAL; TOPICAL EVERY 4 HOURS
Refills: 0 | Status: DISCONTINUED | OUTPATIENT
Start: 2024-01-07 | End: 2024-01-09

## 2024-01-07 RX ORDER — DIBUCAINE 1 %
1 OINTMENT (GRAM) RECTAL EVERY 6 HOURS
Refills: 0 | Status: DISCONTINUED | OUTPATIENT
Start: 2024-01-07 | End: 2024-01-09

## 2024-01-07 RX ORDER — LANOLIN
1 OINTMENT (GRAM) TOPICAL EVERY 6 HOURS
Refills: 0 | Status: DISCONTINUED | OUTPATIENT
Start: 2024-01-07 | End: 2024-01-09

## 2024-01-07 RX ORDER — ACETAMINOPHEN 500 MG
975 TABLET ORAL EVERY 6 HOURS
Refills: 0 | Status: DISCONTINUED | OUTPATIENT
Start: 2024-01-07 | End: 2024-01-09

## 2024-01-07 RX ORDER — OXYCODONE HYDROCHLORIDE 5 MG/1
5 TABLET ORAL
Refills: 0 | Status: DISCONTINUED | OUTPATIENT
Start: 2024-01-07 | End: 2024-01-09

## 2024-01-07 RX ORDER — SIMETHICONE 80 MG/1
80 TABLET, CHEWABLE ORAL EVERY 4 HOURS
Refills: 0 | Status: DISCONTINUED | OUTPATIENT
Start: 2024-01-07 | End: 2024-01-09

## 2024-01-07 RX ORDER — TETANUS TOXOID, REDUCED DIPHTHERIA TOXOID AND ACELLULAR PERTUSSIS VACCINE, ADSORBED 5; 2.5; 8; 8; 2.5 [IU]/.5ML; [IU]/.5ML; UG/.5ML; UG/.5ML; UG/.5ML
0.5 SUSPENSION INTRAMUSCULAR ONCE
Refills: 0 | Status: DISCONTINUED | OUTPATIENT
Start: 2024-01-07 | End: 2024-01-09

## 2024-01-07 RX ORDER — FERROUS SULFATE 325(65) MG
325 TABLET ORAL DAILY
Refills: 0 | Status: DISCONTINUED | OUTPATIENT
Start: 2024-01-07 | End: 2024-01-08

## 2024-01-07 RX ORDER — ASCORBIC ACID 60 MG
500 TABLET,CHEWABLE ORAL DAILY
Refills: 0 | Status: DISCONTINUED | OUTPATIENT
Start: 2024-01-07 | End: 2024-01-09

## 2024-01-07 RX ORDER — PRAMOXINE HYDROCHLORIDE 150 MG/15G
1 AEROSOL, FOAM RECTAL EVERY 4 HOURS
Refills: 0 | Status: DISCONTINUED | OUTPATIENT
Start: 2024-01-07 | End: 2024-01-09

## 2024-01-07 RX ORDER — IBUPROFEN 200 MG
600 TABLET ORAL EVERY 6 HOURS
Refills: 0 | Status: COMPLETED | OUTPATIENT
Start: 2024-01-07 | End: 2024-12-05

## 2024-01-07 RX ORDER — KETOROLAC TROMETHAMINE 30 MG/ML
30 SYRINGE (ML) INJECTION ONCE
Refills: 0 | Status: DISCONTINUED | OUTPATIENT
Start: 2024-01-07 | End: 2024-01-09

## 2024-01-07 RX ORDER — SODIUM CHLORIDE 9 MG/ML
3 INJECTION INTRAMUSCULAR; INTRAVENOUS; SUBCUTANEOUS EVERY 8 HOURS
Refills: 0 | Status: DISCONTINUED | OUTPATIENT
Start: 2024-01-07 | End: 2024-01-09

## 2024-01-07 RX ORDER — DIPHENHYDRAMINE HCL 50 MG
25 CAPSULE ORAL EVERY 6 HOURS
Refills: 0 | Status: DISCONTINUED | OUTPATIENT
Start: 2024-01-07 | End: 2024-01-09

## 2024-01-07 RX ADMIN — Medication 1000 MILLIUNIT(S)/MIN: at 14:10

## 2024-01-07 RX ADMIN — SODIUM CHLORIDE 3 MILLILITER(S): 9 INJECTION INTRAMUSCULAR; INTRAVENOUS; SUBCUTANEOUS at 21:19

## 2024-01-07 RX ADMIN — Medication 2 MILLIUNIT(S)/MIN: at 06:45

## 2024-01-07 RX ADMIN — Medication 975 MILLIGRAM(S): at 21:23

## 2024-01-07 RX ADMIN — Medication 975 MILLIGRAM(S): at 22:25

## 2024-01-07 RX ADMIN — SODIUM CHLORIDE 3 MILLILITER(S): 9 INJECTION INTRAMUSCULAR; INTRAVENOUS; SUBCUTANEOUS at 15:00

## 2024-01-07 NOTE — OB PROVIDER H&P - NS_OBGYNHISTORY_OBGYN_ALL_OB_FT
Patient out in the milieu semi social with peers and staff  Ate HS snack and attended group  Upon approach patient's mood and affect is flat and depressed  Patient endorses moderate anxiety and depression  Denies SI/HI/AHVH/pain  Took HS medications without difficulty  Will continue to monitor safety and behaviors every 7 minutes  Abnormal PAP 05/2023

## 2024-01-07 NOTE — OB PROVIDER H&P - NSLOWPPHRISK_OBGYN_A_OB
No previous uterine incision/[place] Pregnancy/Less than or equal to 4 previous vaginal births/No known bleeding disorder/No history of postpartum hemorrhage/No other PPH risks indicated

## 2024-01-07 NOTE — OB PROVIDER H&P - ASSESSMENT
Primip at term in early labor with SROM  FHR Cat 1  GBS neg  Admit, consent signed/labs drawn  D/w Dr. Avila

## 2024-01-07 NOTE — OB PROVIDER LABOR PROGRESS NOTE - ASSESSMENT
25yo  siup @ 40wks, early labor, light meconium  anesthesia called for epidural reinforcement   continue pitocin augmentation  continuous fetal monitoring  d/w Dr. Avila  NST reviewed with Dr Hilliard 23yo  siup @ 40wks, early labor, light meconium  anesthesia called for epidural reinforcement   continue pitocin augmentation  continuous fetal monitoring  d/w Dr. Avila  NST reviewed with Dr Hilliard

## 2024-01-07 NOTE — OB PROVIDER LABOR PROGRESS NOTE - ASSESSMENT
25yo  siup @ 40wks, early labor, light meconium  continuous fetal monitoring  pitocin discontinued  anticipate vaginal delivery  d/w Dr. Avila, on her way  NST reviewed with Dr Hilliard 23yo  siup @ 40wks, early labor, light meconium  continuous fetal monitoring  pitocin discontinued  anticipate vaginal delivery  d/w Dr. Avila, on her way  NST reviewed with Dr Hilliard

## 2024-01-07 NOTE — OB PROVIDER LABOR PROGRESS NOTE - ASSESSMENT
25yo  siup @ 40wks, early labor, suspected SROM  arom forebag, light meconium  continue current mgmt and pitocin augmentation  epidural reinforcement as needed  continue fetal monitoring  d/w dr. Tran , NST reviewed

## 2024-01-07 NOTE — OB PROVIDER LABOR PROGRESS NOTE - NS_SUBJECTIVE/OBJECTIVE_OBGYN_ALL_OB_FT
25yo  siup @ 40wks, early labor  c/o increased contraction pains and pressure despite epidural reinforcement

## 2024-01-07 NOTE — OB PROVIDER LABOR PROGRESS NOTE - NS_SUBJECTIVE/OBJECTIVE_OBGYN_ALL_OB_FT
23yo  siup @ 40wks, early labor, suspected SROM  seen at bedside s/p epidural, comfortable with no complaints. 25yo  siup @ 40wks, early labor, suspected SROM  seen at bedside s/p epidural, comfortable with no complaints.

## 2024-01-07 NOTE — OB PROVIDER LABOR PROGRESS NOTE - NS_OBIHIFHRDETAILS_OBGYN_ALL_OB_FT
130, mod bri, accels present, no decels
FH 140s, moderate variability +accels, +occasional spontaneous mild variables
FH 140s, moderate variability +accels, +occasional spontaneous mild variables, early decelerations
Cat 1

## 2024-01-07 NOTE — OB PROVIDER IHI INDUCTION/AUGMENTATION NOTE - NS_OBIHIATTENDATTEST_OBGYN_ALL_OB
Chief Complaint   Patient presents with    Depression     Patient is here to follow up on depression since starting on Prozac. Visit Vitals  /87 (BP 1 Location: Right arm, BP Patient Position: Sitting, BP Cuff Size: Adult long)   Pulse 77   Ht 5' 9.49\" (1.765 m)   Wt 216 lb (98 kg)   SpO2 98%   BMI 31.45 kg/m²     Repeat bp was taken manually and was 110/80. I have reviewed the history and the physical examination of the patient, as well as the assessment and plan, as was entered by the resident physician or the mid-level provider. I agree with the plan to induce or augment labor, and in my opinion, at this time, the use of Pitocin, and/or other induction agent(s), is safe and beneficial to mother and fetus.

## 2024-01-07 NOTE — OB PROVIDER DELIVERY SUMMARY - NSPROVIDERDELIVERYNOTE_OBGYN_ALL_OB_FT
23 y/o now  experienced  over 1° MLE. The infant's head was delivered in a controlled manner. Amniotic fluid was mec stained. No nuchal cord was noted. The infant's body was then delivered  in the usual manner without difficulty. The cord was clamped and cut. The placenta delivered intact with 3VC followed by 20 units of Pitocin IV and uterine massage for hemostasis. EBL= 200ml. The 1° MLE and R labial tear were repaired in the usual manner. The cervix and vagina were inspected for lacs and none were noted. The infant, viable male Apgars 9, 9 25 y/o now  experienced  over 1° MLE. The infant's head was delivered in a controlled manner. Amniotic fluid was mec stained. No nuchal cord was noted. The infant's body was then delivered  in the usual manner without difficulty. The cord was clamped and cut. The placenta delivered intact with 3VC followed by 20 units of Pitocin IV and uterine massage for hemostasis. EBL= 200ml. The 1° MLE and R labial tear were repaired in the usual manner. The cervix and vagina were inspected for lacs and none were noted. The infant, viable male Apgars 9, 9

## 2024-01-08 ENCOUNTER — TRANSCRIPTION ENCOUNTER (OUTPATIENT)
Age: 24
End: 2024-01-08

## 2024-01-08 DIAGNOSIS — O47.1 FALSE LABOR AT OR AFTER 37 COMPLETED WEEKS OF GESTATION: ICD-10-CM

## 2024-01-08 DIAGNOSIS — Z3A.39 39 WEEKS GESTATION OF PREGNANCY: ICD-10-CM

## 2024-01-08 LAB
BASOPHILS # BLD AUTO: 0.04 K/UL — SIGNIFICANT CHANGE UP (ref 0–0.2)
BASOPHILS # BLD AUTO: 0.04 K/UL — SIGNIFICANT CHANGE UP (ref 0–0.2)
BASOPHILS NFR BLD AUTO: 0.3 % — SIGNIFICANT CHANGE UP (ref 0–2)
BASOPHILS NFR BLD AUTO: 0.3 % — SIGNIFICANT CHANGE UP (ref 0–2)
EOSINOPHIL # BLD AUTO: 0.08 K/UL — SIGNIFICANT CHANGE UP (ref 0–0.5)
EOSINOPHIL # BLD AUTO: 0.08 K/UL — SIGNIFICANT CHANGE UP (ref 0–0.5)
EOSINOPHIL NFR BLD AUTO: 0.6 % — SIGNIFICANT CHANGE UP (ref 0–6)
EOSINOPHIL NFR BLD AUTO: 0.6 % — SIGNIFICANT CHANGE UP (ref 0–6)
HCT VFR BLD CALC: 24.2 % — LOW (ref 34.5–45)
HCT VFR BLD CALC: 24.2 % — LOW (ref 34.5–45)
HGB BLD-MCNC: 7.6 G/DL — LOW (ref 11.5–15.5)
HGB BLD-MCNC: 7.6 G/DL — LOW (ref 11.5–15.5)
IMM GRANULOCYTES NFR BLD AUTO: 0.7 % — SIGNIFICANT CHANGE UP (ref 0–0.9)
IMM GRANULOCYTES NFR BLD AUTO: 0.7 % — SIGNIFICANT CHANGE UP (ref 0–0.9)
LYMPHOCYTES # BLD AUTO: 2.9 K/UL — SIGNIFICANT CHANGE UP (ref 1–3.3)
LYMPHOCYTES # BLD AUTO: 2.9 K/UL — SIGNIFICANT CHANGE UP (ref 1–3.3)
LYMPHOCYTES # BLD AUTO: 21.9 % — SIGNIFICANT CHANGE UP (ref 13–44)
LYMPHOCYTES # BLD AUTO: 21.9 % — SIGNIFICANT CHANGE UP (ref 13–44)
MCHC RBC-ENTMCNC: 25.9 PG — LOW (ref 27–34)
MCHC RBC-ENTMCNC: 25.9 PG — LOW (ref 27–34)
MCHC RBC-ENTMCNC: 31.4 GM/DL — LOW (ref 32–36)
MCHC RBC-ENTMCNC: 31.4 GM/DL — LOW (ref 32–36)
MCV RBC AUTO: 82.6 FL — SIGNIFICANT CHANGE UP (ref 80–100)
MCV RBC AUTO: 82.6 FL — SIGNIFICANT CHANGE UP (ref 80–100)
MONOCYTES # BLD AUTO: 0.77 K/UL — SIGNIFICANT CHANGE UP (ref 0–0.9)
MONOCYTES # BLD AUTO: 0.77 K/UL — SIGNIFICANT CHANGE UP (ref 0–0.9)
MONOCYTES NFR BLD AUTO: 5.8 % — SIGNIFICANT CHANGE UP (ref 2–14)
MONOCYTES NFR BLD AUTO: 5.8 % — SIGNIFICANT CHANGE UP (ref 2–14)
NEUTROPHILS # BLD AUTO: 9.38 K/UL — HIGH (ref 1.8–7.4)
NEUTROPHILS # BLD AUTO: 9.38 K/UL — HIGH (ref 1.8–7.4)
NEUTROPHILS NFR BLD AUTO: 70.7 % — SIGNIFICANT CHANGE UP (ref 43–77)
NEUTROPHILS NFR BLD AUTO: 70.7 % — SIGNIFICANT CHANGE UP (ref 43–77)
NRBC # BLD: 0 /100 WBCS — SIGNIFICANT CHANGE UP (ref 0–0)
NRBC # BLD: 0 /100 WBCS — SIGNIFICANT CHANGE UP (ref 0–0)
PLATELET # BLD AUTO: 187 K/UL — SIGNIFICANT CHANGE UP (ref 150–400)
PLATELET # BLD AUTO: 187 K/UL — SIGNIFICANT CHANGE UP (ref 150–400)
RBC # BLD: 2.93 M/UL — LOW (ref 3.8–5.2)
RBC # BLD: 2.93 M/UL — LOW (ref 3.8–5.2)
RBC # FLD: 14.2 % — SIGNIFICANT CHANGE UP (ref 10.3–14.5)
RBC # FLD: 14.2 % — SIGNIFICANT CHANGE UP (ref 10.3–14.5)
WBC # BLD: 13.26 K/UL — HIGH (ref 3.8–10.5)
WBC # BLD: 13.26 K/UL — HIGH (ref 3.8–10.5)
WBC # FLD AUTO: 13.26 K/UL — HIGH (ref 3.8–10.5)
WBC # FLD AUTO: 13.26 K/UL — HIGH (ref 3.8–10.5)

## 2024-01-08 RX ORDER — FERROUS SULFATE 325(65) MG
325 TABLET ORAL THREE TIMES A DAY
Refills: 0 | Status: DISCONTINUED | OUTPATIENT
Start: 2024-01-08 | End: 2024-01-09

## 2024-01-08 RX ORDER — IBUPROFEN 200 MG
600 TABLET ORAL EVERY 6 HOURS
Refills: 0 | Status: DISCONTINUED | OUTPATIENT
Start: 2024-01-08 | End: 2024-01-09

## 2024-01-08 RX ADMIN — Medication 500 MILLIGRAM(S): at 12:24

## 2024-01-08 RX ADMIN — Medication 1 SPRAY(S): at 12:21

## 2024-01-08 RX ADMIN — Medication 975 MILLIGRAM(S): at 13:22

## 2024-01-08 RX ADMIN — SODIUM CHLORIDE 3 MILLILITER(S): 9 INJECTION INTRAMUSCULAR; INTRAVENOUS; SUBCUTANEOUS at 22:54

## 2024-01-08 RX ADMIN — SODIUM CHLORIDE 3 MILLILITER(S): 9 INJECTION INTRAMUSCULAR; INTRAVENOUS; SUBCUTANEOUS at 06:19

## 2024-01-08 RX ADMIN — Medication 1 SPRAY(S): at 03:51

## 2024-01-08 RX ADMIN — Medication 325 MILLIGRAM(S): at 21:40

## 2024-01-08 RX ADMIN — AER TRAVELER 1 APPLICATION(S): 0.5 SOLUTION RECTAL; TOPICAL at 18:21

## 2024-01-08 RX ADMIN — Medication 600 MILLIGRAM(S): at 19:21

## 2024-01-08 RX ADMIN — Medication 975 MILLIGRAM(S): at 12:22

## 2024-01-08 RX ADMIN — Medication 600 MILLIGRAM(S): at 18:21

## 2024-01-08 RX ADMIN — Medication 325 MILLIGRAM(S): at 12:24

## 2024-01-08 RX ADMIN — Medication 600 MILLIGRAM(S): at 23:57

## 2024-01-08 RX ADMIN — SODIUM CHLORIDE 3 MILLILITER(S): 9 INJECTION INTRAMUSCULAR; INTRAVENOUS; SUBCUTANEOUS at 15:21

## 2024-01-08 RX ADMIN — Medication 1 TABLET(S): at 12:24

## 2024-01-08 NOTE — DISCHARGE NOTE OB - PROVIDER TOKENS
PROVIDER:[TOKEN:[[id number]],FOLLOWUP:[1 month],ESTABLISHEDPATIENT:[T]] PROVIDER:[TOKEN:[[id number]],FOLLOWUP:[1 month],ESTABLISHEDPATIENT:[T]] PROVIDER:[TOKEN:[[id number]],FOLLOWUP:[Routine],ESTABLISHEDPATIENT:[T]]

## 2024-01-08 NOTE — DISCHARGE NOTE OB - HOSPITAL COURSE
23 yo  admitted for srom and early labor, pregnancy complicated by chronic anemia. uncomplicated labor and delivery. uncomplicated postpartum . pt is table for d/c  23 yo  admitted for srom and early labor, pregnancy complicated by chronic anemia. uncomplicated labor and delivery. un-complicated postpartum . pt is table for d/c  25 yo  admitted for srom and early labor, pregnancy complicated by chronic anemia. uncomplicated labor and delivery. un-complicated postpartum . pt is table for d/c

## 2024-01-08 NOTE — DISCHARGE NOTE OB - ADDITIONAL INSTRUCTIONS
No sex, nothing in vagina, no heavy lifting, no pushing, no straining, no strenuous activities  Pain medication as needed; stool softener; dulcolax as needed if constipated  Walk for exercise: helps recovery   Continue prenatal vitamins daily especially whole course of breastfeeding  See your OB in the office for follow up post partum check 4-5 weeks call the office to set up an appointment

## 2024-01-08 NOTE — LACTATION INITIAL EVALUATION - LACTATION INTERVENTIONS
pt. states baby sleepy and will not feed; reviewed feeding cues and tips for keeping baby awake at breast; reviewed safe formula feeding/pace feeding techniques; baby was able to take 30ml formula; encouraged to con't to attempt breastfeeding on demand/initiate/review supplementation plan due to medical indications

## 2024-01-08 NOTE — DISCHARGE NOTE OB - PLAN OF CARE
No sex, nothing in vagina, no heavy lifting, no pushing, no straining, no strenuous activities  Pain medication as needed; stool softener; dulcolax as needed if constipated  Walk for exercise: helps recovery   Continue prenatal vitamins daily especially whole course of breastfeeding  See your OB in the office for follow up post partum check 4-5 weeks call the office to set up an appointment take iron, folic acid, vitamin C, and prenatal vitamins. eat iron fortified food

## 2024-01-08 NOTE — DISCHARGE NOTE OB - CARE PROVIDERS DIRECT ADDRESSES
,DirectAddress_Unknown ,[email or web address]

## 2024-01-08 NOTE — LACTATION INITIAL EVALUATION - LACTATION INTERVENTIONS
pt. plans to breast and formula feed; states baby not latching; taught feeding cues, positioning and latch techniques and hand expression; Baby is SGA; benefits of feeding on demand reviewed; pt. prefers to Supplemen t with formula; reviewed safe formula feeding/prep inst. ; Breastfeeding on cue 8-12X/24 hours with diaper count to assess for adequate intake, safe skin to skin and rooming-in encouraged./initiate/review safe skin-to-skin/initiate/review hand expression/initiate/review techniques for position and latch/review techniques to increase milk supply/reviewed components of an effective feeding and at least 8 effective feedings per day required/reviewed importance of monitoring infant diapers, the breastfeeding log, and minimum output each day/reviewed benefits and recommendations for rooming in/reviewed feeding on demand/by cue at least 8 times a day/reviewed indications of inadequate milk transfer that would require supplementation

## 2024-01-08 NOTE — DISCHARGE NOTE OB - PATIENT PORTAL LINK FT
You can access the FollowMyHealth Patient Portal offered by [facility] by registering at the following website: [email or web address]. By joining [employer]’s FollowMyHealth portal, you will also be able to view your health information using other applications (apps) compatible with our system. You can access the FollowMyHealth Patient Portal offered by [facility] by registering at the following website: [email or web address]. By joining [employer]’s FollowMyHealth portal, you will also be able to view your health information using other applications (apps) compatible with our system.

## 2024-01-08 NOTE — DISCHARGE NOTE OB - MEDICATION SUMMARY - MEDICATIONS TO TAKE
I will START or STAY ON the medications listed below when I get home from the hospital:    ibuprofen 600 mg oral tablet  -- 1 tab(s) by mouth every 6 hours as needed for  moderate pain  -- Indication: For moderate pain    Tylenol 325 mg oral capsule  -- 3 cap(s) by mouth every 6 hours as needed for  mild pain  -- Indication: For mild pain    Prenatal Plus oral tablet  -- 1 tab(s) by mouth once a day  -- Indication: For breastfeeding    ferrous sulfate 325 mg (65 mg elemental iron) oral tablet  -- 1 tab(s) by mouth once a day  -- Indication: For anemia    Colace 2-in-1 50 mg-8.6 mg oral tablet  -- 2 tab(s) by mouth once a day (at bedtime) as needed for  constipation  -- Indication: For constipation    simethicone 80 mg oral tablet, chewable  -- 1 tab(s) chewed every 6 hours as needed for  heartburn  -- Indication: For gas pain    ascorbic acid 500 mg oral tablet  -- 1 tab(s) by mouth once a day  -- Indication: For anemia

## 2024-01-08 NOTE — DISCHARGE NOTE OB - MEDICATION SUMMARY - MEDICATIONS TO STOP TAKING
I will STOP taking the medications listed below when I get home from the hospital:    Tylenol 8 Hour 650 mg oral tablet, extended release  -- 2 tab(s) by mouth 4 times a day    cephalexin 500 mg oral tablet  -- 1 tab(s) by mouth 2 times a day    Macrobid 100 mg oral capsule  -- 1 cap(s) by mouth 2 times a day   I will STOP taking the medications listed below when I get home from the hospital:    cephalexin 500 mg oral tablet  -- 1 tab(s) by mouth 2 times a day    Macrobid 100 mg oral capsule  -- 1 cap(s) by mouth 2 times a day

## 2024-01-08 NOTE — LACTATION INITIAL EVALUATION - LACTATION INTERVENTIONS
pt. c/o difficulty latching; nipples remain short despite everting; provided with nipple shield and explained risks/benefits, proper use and cleaning; baby latched with a few suckles; gets frustrated at breast as no colostrum noted yet; pt. Supplemented with formula; baby's feedings improved since this morning; Breastfeeding on cue 8-12X/24 hours with diaper count to assess for adequate intake, safe skin to skin and rooming-in encouraged.; reviewed safe formula feeding; taught diaper care as baby voided and passed stool; FOB and paternal Grandmother at bedside during this visit and also verbalized understanding of education provided/initiate/review safe skin-to-skin/initiate/review techniques for position and latch/initiate/review supplementation plan due to medical indications/initiate/review nipple shield use/review techniques to increase milk supply/reviewed components of an effective feeding and at least 8 effective feedings per day required/reviewed importance of monitoring infant diapers, the breastfeeding log, and minimum output each day/reviewed benefits and recommendations for rooming in/reviewed feeding on demand/by cue at least 8 times a day

## 2024-01-08 NOTE — DISCHARGE NOTE OB - CARE PLAN
1 Principal Discharge DX:	 (normal spontaneous vaginal delivery)  Assessment and plan of treatment:	No sex, nothing in vagina, no heavy lifting, no pushing, no straining, no strenuous activities  Pain medication as needed; stool softener; dulcolax as needed if constipated  Walk for exercise: helps recovery   Continue prenatal vitamins daily especially whole course of breastfeeding  See your OB in the office for follow up post partum check 4-5 weeks call the office to set up an appointment  Secondary Diagnosis:	Chronic anemia  Assessment and plan of treatment:	take iron, folic acid, vitamin C, and prenatal vitamins. eat iron fortified food

## 2024-01-08 NOTE — DISCHARGE NOTE OB - MATERIALS PROVIDED
Vaccinations/[facility]  Screening Program/  Immunization Record/Breastfeeding Log/Breastfeeding Mother’s Support Group Information/Guide to Postpartum Care/[facility] Hearing Screen Program/Back To Sleep Handout/Shaken Baby Prevention Handout/Breastfeeding Guide and Packet/Birth Certificate Instructions/Discharge Medication Information for Patients and Families Pocket Guide Vaccinations/[facility]  Screening Program/  Immunization Record/Breastfeeding Log/Breastfeeding Mother’s Support Group Information/Guide to Postpartum Care/[facility] Hearing Screen Program/Back To Sleep Handout/Shaken Baby Prevention Handout/Breastfeeding Guide and Packet/Birth Certificate Instructions/Discharge Medication Information for Patients and Families Pocket Guide

## 2024-01-08 NOTE — DISCHARGE NOTE OB - CARE PROVIDER_API CALL
Teodora Avila  Obstetrics and Gynecology  [address], [address]  [place] [address]  Phone: [phone]  Fax: [phone]  Established Patient  Follow Up Time: 1 month   Teodora Avila  Obstetrics and Gynecology  [address], [address]  [place] [address]  Phone: [phone]  Fax: [phone]  Established Patient  Follow Up Time: 1 month   Power Guerralando  Obstetrics and Gynecology  [address] [place] [address], [address] [place] [address]  Phone: [phone]  Fax: [phone]  Established Patient  Follow Up Time: Routine   Poewr Guerralando  Obstetrics and Gynecology  [address] [place] [address], [address] [place] [address]  Phone: [phone]  Fax: [phone]  Established Patient  Follow Up Time: Routine

## 2024-01-08 NOTE — DISCHARGE NOTE OB - NS MD DC FALL RISK RISK
For information on Fall & Injury Prevention, visit: [email or web address] OR  [email or web address] OR  https://www.cdc.gov/steadi/patient.html For information on Fall & Injury Prevention, visit: [email or web address] OR  [email or web address] OR  https://www.cdc.gov/steadi/patient.html

## 2024-01-09 VITALS
TEMPERATURE: 98 F | SYSTOLIC BLOOD PRESSURE: 105 MMHG | RESPIRATION RATE: 18 BRPM | HEART RATE: 67 BPM | OXYGEN SATURATION: 100 % | DIASTOLIC BLOOD PRESSURE: 61 MMHG

## 2024-01-09 LAB
BASOPHILS # BLD AUTO: 0.07 K/UL — SIGNIFICANT CHANGE UP (ref 0–0.2)
BASOPHILS # BLD AUTO: 0.07 K/UL — SIGNIFICANT CHANGE UP (ref 0–0.2)
BASOPHILS NFR BLD AUTO: 0.6 % — SIGNIFICANT CHANGE UP (ref 0–2)
BASOPHILS NFR BLD AUTO: 0.6 % — SIGNIFICANT CHANGE UP (ref 0–2)
EOSINOPHIL # BLD AUTO: 0.2 K/UL — SIGNIFICANT CHANGE UP (ref 0–0.5)
EOSINOPHIL # BLD AUTO: 0.2 K/UL — SIGNIFICANT CHANGE UP (ref 0–0.5)
EOSINOPHIL NFR BLD AUTO: 1.7 % — SIGNIFICANT CHANGE UP (ref 0–6)
EOSINOPHIL NFR BLD AUTO: 1.7 % — SIGNIFICANT CHANGE UP (ref 0–6)
HCT VFR BLD CALC: 23.9 % — LOW (ref 34.5–45)
HCT VFR BLD CALC: 23.9 % — LOW (ref 34.5–45)
HGB BLD-MCNC: 7.6 G/DL — LOW (ref 11.5–15.5)
HGB BLD-MCNC: 7.6 G/DL — LOW (ref 11.5–15.5)
IMM GRANULOCYTES NFR BLD AUTO: 1 % — HIGH (ref 0–0.9)
IMM GRANULOCYTES NFR BLD AUTO: 1 % — HIGH (ref 0–0.9)
LYMPHOCYTES # BLD AUTO: 28.5 % — SIGNIFICANT CHANGE UP (ref 13–44)
LYMPHOCYTES # BLD AUTO: 28.5 % — SIGNIFICANT CHANGE UP (ref 13–44)
LYMPHOCYTES # BLD AUTO: 3.44 K/UL — HIGH (ref 1–3.3)
LYMPHOCYTES # BLD AUTO: 3.44 K/UL — HIGH (ref 1–3.3)
MCHC RBC-ENTMCNC: 25.8 PG — LOW (ref 27–34)
MCHC RBC-ENTMCNC: 25.8 PG — LOW (ref 27–34)
MCHC RBC-ENTMCNC: 31.8 GM/DL — LOW (ref 32–36)
MCHC RBC-ENTMCNC: 31.8 GM/DL — LOW (ref 32–36)
MCV RBC AUTO: 81 FL — SIGNIFICANT CHANGE UP (ref 80–100)
MCV RBC AUTO: 81 FL — SIGNIFICANT CHANGE UP (ref 80–100)
MONOCYTES # BLD AUTO: 0.69 K/UL — SIGNIFICANT CHANGE UP (ref 0–0.9)
MONOCYTES # BLD AUTO: 0.69 K/UL — SIGNIFICANT CHANGE UP (ref 0–0.9)
MONOCYTES NFR BLD AUTO: 5.7 % — SIGNIFICANT CHANGE UP (ref 2–14)
MONOCYTES NFR BLD AUTO: 5.7 % — SIGNIFICANT CHANGE UP (ref 2–14)
NEUTROPHILS # BLD AUTO: 7.54 K/UL — HIGH (ref 1.8–7.4)
NEUTROPHILS # BLD AUTO: 7.54 K/UL — HIGH (ref 1.8–7.4)
NEUTROPHILS NFR BLD AUTO: 62.5 % — SIGNIFICANT CHANGE UP (ref 43–77)
NEUTROPHILS NFR BLD AUTO: 62.5 % — SIGNIFICANT CHANGE UP (ref 43–77)
NRBC # BLD: 0 /100 WBCS — SIGNIFICANT CHANGE UP (ref 0–0)
NRBC # BLD: 0 /100 WBCS — SIGNIFICANT CHANGE UP (ref 0–0)
PLATELET # BLD AUTO: 212 K/UL — SIGNIFICANT CHANGE UP (ref 150–400)
PLATELET # BLD AUTO: 212 K/UL — SIGNIFICANT CHANGE UP (ref 150–400)
RBC # BLD: 2.95 M/UL — LOW (ref 3.8–5.2)
RBC # BLD: 2.95 M/UL — LOW (ref 3.8–5.2)
RBC # FLD: 14.4 % — SIGNIFICANT CHANGE UP (ref 10.3–14.5)
RBC # FLD: 14.4 % — SIGNIFICANT CHANGE UP (ref 10.3–14.5)
WBC # BLD: 12.06 K/UL — HIGH (ref 3.8–10.5)
WBC # BLD: 12.06 K/UL — HIGH (ref 3.8–10.5)
WBC # FLD AUTO: 12.06 K/UL — HIGH (ref 3.8–10.5)
WBC # FLD AUTO: 12.06 K/UL — HIGH (ref 3.8–10.5)

## 2024-01-09 PROCEDURE — 87340 HEPATITIS B SURFACE AG IA: CPT

## 2024-01-09 PROCEDURE — 86900 BLOOD TYPING SEROLOGIC ABO: CPT

## 2024-01-09 PROCEDURE — 85610 PROTHROMBIN TIME: CPT

## 2024-01-09 PROCEDURE — 85384 FIBRINOGEN ACTIVITY: CPT

## 2024-01-09 PROCEDURE — 87389 HIV-1 AG W/HIV-1&-2 AB AG IA: CPT

## 2024-01-09 PROCEDURE — 36415 COLL VENOUS BLD VENIPUNCTURE: CPT

## 2024-01-09 PROCEDURE — 85730 THROMBOPLASTIN TIME PARTIAL: CPT

## 2024-01-09 PROCEDURE — 86901 BLOOD TYPING SEROLOGIC RH(D): CPT

## 2024-01-09 PROCEDURE — 86762 RUBELLA ANTIBODY: CPT

## 2024-01-09 PROCEDURE — 86703 HIV-1/HIV-2 1 RESULT ANTBDY: CPT

## 2024-01-09 PROCEDURE — 59050 FETAL MONITOR W/REPORT: CPT

## 2024-01-09 PROCEDURE — 85025 COMPLETE CBC W/AUTO DIFF WBC: CPT

## 2024-01-09 PROCEDURE — 86850 RBC ANTIBODY SCREEN: CPT

## 2024-01-09 PROCEDURE — 86780 TREPONEMA PALLIDUM: CPT

## 2024-01-09 RX ORDER — IBUPROFEN 200 MG
1 TABLET ORAL
Qty: 56 | Refills: 0
Start: 2024-01-09 | End: 2024-01-22

## 2024-01-09 RX ORDER — ACETAMINOPHEN 500 MG
3 TABLET ORAL
Qty: 168 | Refills: 0
Start: 2024-01-09 | End: 2024-01-22

## 2024-01-09 RX ORDER — SIMETHICONE 80 MG/1
1 TABLET, CHEWABLE ORAL
Qty: 56 | Refills: 0
Start: 2024-01-09 | End: 2024-01-22

## 2024-01-09 RX ORDER — FERROUS SULFATE 325(65) MG
1 TABLET ORAL
Qty: 30 | Refills: 0
Start: 2024-01-09 | End: 2024-02-07

## 2024-01-09 RX ORDER — ASCORBIC ACID 60 MG
1 TABLET,CHEWABLE ORAL
Qty: 30 | Refills: 0
Start: 2024-01-09 | End: 2024-02-07

## 2024-01-09 RX ORDER — SENNOSIDES/DOCUSATE SODIUM 8.6MG-50MG
2 TABLET ORAL
Qty: 28 | Refills: 0
Start: 2024-01-09 | End: 2024-01-22

## 2024-01-09 RX ADMIN — Medication 325 MILLIGRAM(S): at 05:51

## 2024-01-09 RX ADMIN — SODIUM CHLORIDE 3 MILLILITER(S): 9 INJECTION INTRAMUSCULAR; INTRAVENOUS; SUBCUTANEOUS at 06:17

## 2024-01-09 RX ADMIN — Medication 600 MILLIGRAM(S): at 00:57

## 2024-01-09 NOTE — LACTATION INITIAL EVALUATION - AS DELIV COMPLICATIONS OB
see Delivery Summary/meconium stained fluid

## 2024-01-09 NOTE — PROGRESS NOTE ADULT - ASSESSMENT
A/P:  24y F Postpartum day #1 s/p  @40 weeks admitted for srom. acute on chronic anemia, currently in stable condition  - orthostatic vitals wnl  - iron supplements  -Continue pain management  -Encourage OOB and ambulation  -f/u CBC in am  -Continue post partum care  -Plan for discharge tomorrow.     Attending aware 
   A/P: 24   PPD#2 s/p  @ 40w1d, Pt complicated by chronic anemia, pt stable    - Labs and vitals reviewed   - Anemia discussed, Iron sent to pharm. Iron rich foods discussed   - Discharge home with instructions  - Follow up in office in 4-6 weeks for postpartum visit  - Breastfeeding encouraged   - Ambulation encouraged     -d/w Dr. Guerra

## 2024-01-09 NOTE — LACTATION INITIAL EVALUATION - POTENTIAL FOR
knowledge deficit/latch on difficulty
knowledge deficit
knowledge deficit/latch on difficulty
knowledge deficit/latch on difficulty

## 2024-01-09 NOTE — LACTATION INITIAL EVALUATION - ACTUAL PROBLEM
knowledge deficit
knowledge deficit/latch on difficulty
knowledge deficit
knowledge deficit/latch on difficulty

## 2024-01-09 NOTE — LACTATION INITIAL EVALUATION - LACTATION INTERVENTIONS
pt. with short nipples; reviewed inst. for use of Nipple shield; pt. has been mostly formula feeding; baby feeds formula well via bottle per pt. and RN report; pt. scheduled for d/c home today with baby; attended mother-baby breastfeeding and d/c class this morning; pt's questions addressed; pt. states already has a Breast pump at home; reviewed inst. for use, collection/storage to provide baby with her EHM; Referred to [facility] Tele-lactation program for breastfeeding f/u assessment and support post-discharge/initiate/review hand expression/initiate/review pumping guidelines and safe milk handling/initiate/review techniques for position and latch/post discharge community resources provided/initiate/review supplementation plan due to medical indications/initiate/review nipple shield use/reviewed components of an effective feeding and at least 8 effective feedings per day required/reviewed importance of monitoring infant diapers, the breastfeeding log, and minimum output each day/reviewed benefits and recommendations for rooming in/reviewed feeding on demand/by cue at least 8 times a day/reviewed indications of inadequate milk transfer that would require supplementation pt. with short nipples; reviewed inst. for use of Nipple shield; pt. has been mostly formula feeding; baby feeds formula well via bottle per pt. and RN report; pt. scheduled for d/c home today with baby; attended mother-baby breastfeeding and d/c class this morning; pt's questions addressed; pt. states already has a Breast pump at home; reviewed inst. for use, collection/storage to provide baby with her EHM; Referred to [facility] Tele-lactation program for breastfeeding f/u assessment and support post-discharge/initiate/review hand expression/initiate/review pumping guidelines and safe milk handling/initiate/review techniques for position and latch/post discharge community resources provided/initiate/review supplementation plan due to medical indications/initiate/review nipple shield use/reviewed components of an effective feeding and at least 8 effective feedings per day required/reviewed importance of monitoring infant diapers, the breastfeeding log, and minimum output each day/reviewed benefits and recommendations for rooming in/reviewed feeding on demand/by cue at least 8 times a day/reviewed indications of inadequate milk transfer that would require supplementation pt. with short nipples; reviewed inst. for use of Nipple shield; pt. has been mostly formula feeding overnight per pt's choice; baby feeds formula well via bottle per pt.; pt. scheduled for d/c home today with baby; attended mother-baby breastfeeding and d/c class this morning; pt's questions addressed; pt. states already has a Breast pump at home; reviewed inst. for use, collection/storage to provide baby with her EHM; Referred to [facility] Tele-lactation program for breastfeeding f/u assessment and support post-discharge; All education provided in Niuean both verbally and written per pt's preference/initiate/review hand expression/initiate/review pumping guidelines and safe milk handling/initiate/review techniques for position and latch/post discharge community resources provided/initiate/review supplementation plan due to medical indications/initiate/review nipple shield use/reviewed components of an effective feeding and at least 8 effective feedings per day required/reviewed importance of monitoring infant diapers, the breastfeeding log, and minimum output each day/reviewed benefits and recommendations for rooming in/reviewed feeding on demand/by cue at least 8 times a day/reviewed indications of inadequate milk transfer that would require supplementation pt. with short nipples; reviewed inst. for use of Nipple shield; pt. has been mostly formula feeding overnight per pt's choice; baby feeds formula well via bottle per pt.; pt. scheduled for d/c home today with baby; attended mother-baby breastfeeding and d/c class this morning; pt's questions addressed; pt. states already has a Breast pump at home; reviewed inst. for use, collection/storage to provide baby with her EHM; Referred to [facility] Tele-lactation program for breastfeeding f/u assessment and support post-discharge; All education provided in [place] both verbally and written per pt's preference/initiate/review hand expression/initiate/review pumping guidelines and safe milk handling/initiate/review techniques for position and latch/post discharge community resources provided/initiate/review supplementation plan due to medical indications/initiate/review nipple shield use/reviewed components of an effective feeding and at least 8 effective feedings per day required/reviewed importance of monitoring infant diapers, the breastfeeding log, and minimum output each day/reviewed benefits and recommendations for rooming in/reviewed feeding on demand/by cue at least 8 times a day/reviewed indications of inadequate milk transfer that would require supplementation

## 2024-01-09 NOTE — LACTATION INITIAL EVALUATION - INTERVENTION OUTCOME
[facility] Tele-lactation program/verbalizes understanding/demonstrates understanding of teaching/Lactation team to follow up [facility] Tele-lactation program/verbalizes understanding/demonstrates understanding of teaching/Lactation team to follow up [facility] Tele-lactation program/verbalizes understanding/demonstrates understanding of teaching/needs met/Lactation team to follow up [facility] Tele-lactation program/verbalizes understanding/demonstrates understanding of teaching/needs met/Lactation team to follow up

## 2024-01-09 NOTE — PROGRESS NOTE ADULT - SUBJECTIVE AND OBJECTIVE BOX
delayed entry note      OBGYN PA NOTE PPD1  Patient seen and evaluated at bedside,  resting comfortably w/o any acute  complaints.  Denies fever, HA, CP, SOB, N/V/D, dizziness, palpitations, worsening abdominal pain, worsening vaginal bleeding, or any other concerns.  Ambulating and voiding without difficulty.  Passing flatus and tolerating regular diet.  Attempting to breastfeed.     Vital Signs Last 24 Hrs  T(C): 37 (08 Jan 2024 04:48), Max: 37 (08 Jan 2024 04:48)  T(F): 98.6 (08 Jan 2024 04:48), Max: 98.6 (08 Jan 2024 04:48)  HR: 98 (08 Jan 2024 04:48) (98 - 98)  BP: 98/62 (08 Jan 2024 04:48) (98/62 - 98/62)  BP(mean): --  RR: 19 (08 Jan 2024 04:48) (19 - 19)  SpO2: 98% (08 Jan 2024 04:48) (98% - 98%)    Parameters below as of 08 Jan 2024 04:48  Patient On (Oxygen Delivery Method): room air    Physical Exam:     Gen: A&Ox 3, NAD  Chest: CTAB/L  Cardiac: S1+S2+ RRR  Breast: Soft, nontender, nonengorged  Abdomen: Soft, nontender, Fundus firm below umbilicus, +BS  Gyn: Mild lochia, repaired  Extremities: Nontender, DTRS 2+, no worsening edema                          7.6    13.26 )-----------( 187      ( 08 Jan 2024 07:11 )             24.2     
Patient seen at bedside resting comfortably offers no current complaints.  Ambulating and voiding without difficulty.  Passing flatus and tolerating regular diet. Pt both breast/bottle feeding.  Pt denies weakness, headache, chest pain, shortness of breath, N/V/D, dizziness, palpitations, worsening abdominal pain, worsening vaginal bleeding, or any other concerns.      Vital Signs Last 24 Hrs  T(C): 36.8 (2024 06:03), Max: 36.8 (2024 06:03)  T(F): 98.2 (2024 06:03), Max: 98.2 (2024 06:03)  HR: 67 (2024 06:03) (67 - 67)  BP: 105/61 (2024 06:03) (105/61 - 105/61)  BP(mean): --  RR: 18 (2024 06:03) (18 - 18)  SpO2: 100% (2024 06:03) (100% - 100%)    Parameters below as of 2024 06:03  Patient On (Oxygen Delivery Method): room air    Physical Exam:   Gen: A&Ox 3, NAD  Breast: Soft, nontender, nonengorged  Abdomen: Soft, nontender, ND; Fundus firm below umbilicus  Gyn: Min lochia  Ext: Nontender, no worsening edema                        7.6    12.06 )-----------( 212      ( 2024 06:10 )             23.9        A/P: 24   PPD#2 s/p  @ 40w1d, Pt complicated by chronic anemia, pt stable    - Labs and vitals reviewed   - Anemia discussed, Iron sent to pharm. Iron rich foods discussed   - Discharge home with instructions  - Follow up in office in 4-6 weeks for postpartum visit  - Breastfeeding encouraged   - Ambulation encouraged     -d/w Dr. Guerra

## 2024-01-09 NOTE — LACTATION INITIAL EVALUATION - NS LACT CON REASON FOR REQ
follow up consultation
primaparous mom
primaparous mom/follow up consultation
primaparous mom/follow up consultation

## 2024-01-09 NOTE — LACTATION INITIAL EVALUATION - MILK SUPPLY
none noted on expression during exam
none noted yet
none noted on expression during exam
none noted yet

## 2024-01-09 NOTE — LACTATION INITIAL EVALUATION - NIPPLE ASSESSMENT (RIGHT)
short/normal/dry and intact

## 2024-01-09 NOTE — PROGRESS NOTE ADULT - PROBLEM SELECTOR PLAN 1
A/P: 24   PPD#2 s/p  @ 40w1d, Pt complicated by chronic anemia, pt stable    - Labs and vitals reviewed   - Anemia discussed, Iron sent to pharm. Iron rich foods discussed   - Discharge home with instructions  - Follow up in office in 4-6 weeks for postpartum visit  - Breastfeeding encouraged   - Ambulation encouraged     -d/w Dr. Guerra

## 2024-01-09 NOTE — LACTATION INITIAL EVALUATION - PRO FEEDING PLAN INFANT OB
breast and formula/initiation of breastfeeding/breast milk feeding

## 2024-01-11 PROBLEM — Z78.9 OTHER SPECIFIED HEALTH STATUS: Chronic | Status: INACTIVE | Noted: 2023-08-03 | Resolved: 2023-12-28

## 2024-01-12 ENCOUNTER — APPOINTMENT (OUTPATIENT)
Dept: OBGYN | Facility: CLINIC | Age: 24
End: 2024-01-12

## 2024-02-22 ENCOUNTER — APPOINTMENT (OUTPATIENT)
Dept: OBGYN | Facility: CLINIC | Age: 24
End: 2024-02-22
Payer: SELF-PAY

## 2024-02-22 VITALS — SYSTOLIC BLOOD PRESSURE: 108 MMHG | DIASTOLIC BLOOD PRESSURE: 68 MMHG | WEIGHT: 153 LBS

## 2024-02-22 PROCEDURE — 99213 OFFICE O/P EST LOW 20 MIN: CPT

## 2024-02-28 NOTE — OB RN TRIAGE NOTE - NS_OBGYNHISTORY_OBGYN_ALL_OB_FT
Pt needs a refill of lasix if Dr. Christopher Ramsey MD, FACC  Would like for pt to continue. Odette     Denies med/surg hx

## 2024-03-11 ENCOUNTER — APPOINTMENT (OUTPATIENT)
Dept: OBGYN | Facility: CLINIC | Age: 24
End: 2024-03-11
Payer: COMMERCIAL

## 2024-03-11 VITALS — DIASTOLIC BLOOD PRESSURE: 64 MMHG | SYSTOLIC BLOOD PRESSURE: 98 MMHG | WEIGHT: 157 LBS

## 2024-03-11 DIAGNOSIS — Z30.09 ENCOUNTER FOR OTHER GENERAL COUNSELING AND ADVICE ON CONTRACEPTION: ICD-10-CM

## 2024-03-11 PROCEDURE — 58300 INSERT INTRAUTERINE DEVICE: CPT

## 2024-03-11 NOTE — PLAN
[FreeTextEntry1] : iud insertion , rto one month for iud check and pap . advised barrier contraception .

## 2024-03-11 NOTE — PROCEDURE
[IUD Placement] : intrauterine device (IUD) placement [Consent Obtained] : Consent obtained [Time out performed] : Pre-procedure time out performed.  Patient's name, date of birth and procedure confirmed. [Prevention of Pregnancy] : prevention of pregnancy [Risks] : risks [Benefits] : benefits [Patient] : patient [Alternatives] : alternatives [Bleeding] : bleeding [Infection] : infection [Pain] : pain [Expulsion] : expulsion [Failure] : failure [Uterine Perforation] : uterine perforation [Neg Pregnancy Test] : negative pregnancy test [No Premedication] : No premedication [Betadine] : Betadine [Tenaculum] : Tenaculum [Easy Passage] : Easy passage [ParaGard IUD] : ParaGard IUD [No Complications] : No complications [Tolerated Well] : Patient tolerated the procedure well [None] : None

## 2024-04-08 ENCOUNTER — APPOINTMENT (OUTPATIENT)
Dept: OBGYN | Facility: CLINIC | Age: 24
End: 2024-04-08

## 2024-08-05 ENCOUNTER — NON-APPOINTMENT (OUTPATIENT)
Age: 24
End: 2024-08-05

## 2024-08-05 ENCOUNTER — APPOINTMENT (OUTPATIENT)
Dept: OBGYN | Facility: CLINIC | Age: 24
End: 2024-08-05

## 2024-09-11 ENCOUNTER — APPOINTMENT (OUTPATIENT)
Dept: OBGYN | Facility: CLINIC | Age: 24
End: 2024-09-11

## 2024-09-16 NOTE — OB PROVIDER IHI INDUCTION/AUGMENTATION NOTE - LABOR: CERVICAL DILATION
"Daily Note     Today's date: 2024  Patient name: Toney Motley  : 1955  MRN: [id number]  Referring provider: Brandi Rivera PA-C  Dx:   Encounter Diagnosis     ICD-10-CM    1. Lumbar radiculopathy  M54.16       2. S/P lumbar fusion  Z98.1                      Subjective: Patient states improved ability in getting out of bed at the current time.       Objective: See treatment diary below      Assessment: Patient demonstrated moderate challenge with addition of TB sidestepping.        Plan: Continue per plan of care.      Precautions: L2-3,L4-5,L5-S1 fusion - 24, HTN, L/S fusion -   POC expires Unit limit Auth Expiration date PT/OT/ST + Visit Limit?   24 N/A  BOMN                           Visit/Unit Tracking  AUTH Status:  Date                Used                Remaining                   Manuals                                                         Neuro Re-Ed             Sup TA activation HEP 20x3\" 20x3\" 20x3\"  20x5\" 20x5\" 20x5\" 20x5\" 20x5\"    Sup TA with marches HEP 20x ea. 20x ea. 20x ea 20x ea. 20x ea. 20x ea. 20x ea. 20x ea.    Sup TA with alt UE/LE  20x ea. 20x ea.  20x ea. 20x ea. 20x ea. 20x ea. 20x ea.    TB Multifidus press  BTB 20x3\" ea. BTB 20x3\" ea.  BTB 20x3\" ea. np Decl. np np                 Sup TA with iso hip add  20x5\" 20x5\" 20x5\" 20x5\" 20x5\" 20x5\" 20x5\" 20x5\"    Sup TA with Pball presses       20x5\" 20x5\" 20x5\"    Sidestepping w/ TB resist         BTB   5 laps      Ther Ex             Bike  5 min 5 min 5 min 5 min 5 min 5 min 5 min TM 6'    Mini squats HEP 2x10 2x10          TB rows  BTB 2x10 BTB 2x10  BTB 2x10 Decl. Decl. np np    TB lat pull downs  BTB 20x3\" BTB 2x10  BTB 2x10 Decl. Decl. np np    Stand hip ext  2x10 ea. 2x10 ea.  2x10 ea. GTB 2x10 ea GTB 2x10 ea BTB 2x10 ea. BTB 2x10 ea.    Stand hip abd  2x10 ea. 2x10 ea.  2x10 ea. GTB 2x10 ea GTB 2x10 ea BTB 2x10 ea. BTB 2x10 ea.    Sup hip rolls   10x5\" 10x5\"  10x5\" " "10x5\" 10x5\" 20x5\" 20x5\"    Sitting forward Ball roll outs      20x5\" 20x5\" 20x5\" 20x5\" 20x5\"                                           Ther Activity                                       Gait Training                                       Modalities                                              " Greater than or equal to 4 cm

## 2024-10-25 NOTE — OB PROVIDER IHI INDUCTION/AUGMENTATION NOTE - NS_IHIPITOCINATTEND_OBGYN_ALL_OB_FT
[FreeTextEntry1] : Health Maintenance - Ordered routine labs - EKG done and reviewed - Benefits of healthy diet and exercise discussed - Will follow up with lab results   HTN:  - BP discussed - Advised strict low salt diet,  - Daily regular exercise - Only taking metoprolol  50 mg - Will add Valsartan 80 mg - BMP to be monitored closely   HLD: - Advised on strict low-fat diet - Daily regular exercise, - Continue lipitor - FLP/LFT in 6 weeks, - LDL goal < 100  Arthritis - Painkillers PRN - S/p steroid shots - Follow up with orthopedics  SHERLEY - Continue CPAP  Hypothyroidism - Continue Synthroid - Management as per Endocrine Babak

## 2024-10-31 NOTE — LACTATION INITIAL EVALUATION - DEPRESSION
[Varicose Veins Of Lower Extremities] : bilaterally
[Ankle Swelling On The Right] : mild
[1+] : left foot dorsalis pedis 1+
[Skin Color & Pigmentation] : normal skin color and pigmentation
[Skin Turgor] : normal skin turgor
[] : no rash
[Skin Lesions] : no skin lesions
[Ankle Swelling (On Exam)] : not present
[FreeTextEntry3] : CFT: 3 seconds x10. Temperature gradient; within normal limits.
[de-identified] : Muscle strength: 5/5, bilateral.  Mild hammertoe deformities 2 to 4, bilateral.  
[Foot Ulcer] : no foot ulcer
[Skin Induration] : no skin induration
[FreeTextEntry4] : decreased at the 1st MPJ
[FreeTextEntry1] : Light touch is intact, bilaterally.  5.07 [employer] monofilament wire is intact, bilateral. 
[FreeTextEntry8] : decreased at the 1st MPJ
no

## 2025-02-06 ENCOUNTER — LABORATORY RESULT (OUTPATIENT)
Age: 25
End: 2025-02-06

## 2025-02-06 ENCOUNTER — APPOINTMENT (OUTPATIENT)
Dept: OBGYN | Facility: CLINIC | Age: 25
End: 2025-02-06
Payer: COMMERCIAL

## 2025-02-06 VITALS
HEART RATE: 69 BPM | WEIGHT: 170 LBS | OXYGEN SATURATION: 99 % | SYSTOLIC BLOOD PRESSURE: 93 MMHG | DIASTOLIC BLOOD PRESSURE: 34 MMHG

## 2025-02-06 DIAGNOSIS — N89.8 OTHER SPECIFIED NONINFLAMMATORY DISORDERS OF VAGINA: ICD-10-CM

## 2025-02-06 PROCEDURE — 99213 OFFICE O/P EST LOW 20 MIN: CPT

## 2025-02-10 LAB
A VAGINAE DNA VAG QL NAA+PROBE: ABNORMAL
BVAB2 DNA VAG QL NAA+PROBE: ABNORMAL
C KRUSEI DNA VAG QL NAA+PROBE: NEGATIVE
C TRACH RRNA SPEC QL NAA+PROBE: NEGATIVE
CANDIDA DNA VAG QL NAA+PROBE: NEGATIVE
MEGA1 DNA VAG QL NAA+PROBE: ABNORMAL
N GONORRHOEA RRNA SPEC QL NAA+PROBE: NEGATIVE
T VAGINALIS RRNA SPEC QL NAA+PROBE: NEGATIVE

## 2025-02-10 RX ORDER — METRONIDAZOLE 500 MG/1
500 TABLET ORAL
Qty: 14 | Refills: 0 | Status: ACTIVE | COMMUNITY
Start: 2025-02-10 | End: 1900-01-01

## 2025-03-31 NOTE — OB RN TRIAGE NOTE - NS_ZIKATRAVEL_OBGYN_ALL_OB
Pt. Identified for post discharge services. Initial outreach call to introduce self, available services, and assess needs.  Voicemail message left with my contact information.   SANTOS Zamora  [facility]    Contact: [phone]   No

## 2025-06-26 ENCOUNTER — LABORATORY RESULT (OUTPATIENT)
Age: 25
End: 2025-06-26

## 2025-06-26 ENCOUNTER — APPOINTMENT (OUTPATIENT)
Dept: OBGYN | Facility: CLINIC | Age: 25
End: 2025-06-26

## 2025-06-26 VITALS — SYSTOLIC BLOOD PRESSURE: 102 MMHG | WEIGHT: 172 LBS | DIASTOLIC BLOOD PRESSURE: 57 MMHG

## 2025-06-26 PROBLEM — B37.31 CANDIDA VAGINITIS: Status: ACTIVE | Noted: 2023-07-13

## 2025-06-26 PROCEDURE — 99213 OFFICE O/P EST LOW 20 MIN: CPT

## 2025-06-26 RX ORDER — TERCONAZOLE 8 MG/G
0.8 CREAM VAGINAL
Qty: 1 | Refills: 3 | Status: ACTIVE | COMMUNITY
Start: 2025-06-26 | End: 1900-01-01

## 2025-06-26 RX ORDER — AMOXICILLIN AND CLAVULANATE POTASSIUM 500; 125 MG/1; MG/1
500-125 TABLET, FILM COATED ORAL 3 TIMES DAILY
Qty: 1 | Refills: 0 | Status: ACTIVE | COMMUNITY
Start: 2025-06-26 | End: 1900-01-01

## 2025-08-25 ENCOUNTER — APPOINTMENT (OUTPATIENT)
Dept: OBGYN | Facility: CLINIC | Age: 25
End: 2025-08-25